# Patient Record
Sex: MALE | Race: WHITE | NOT HISPANIC OR LATINO | Employment: OTHER | URBAN - METROPOLITAN AREA
[De-identification: names, ages, dates, MRNs, and addresses within clinical notes are randomized per-mention and may not be internally consistent; named-entity substitution may affect disease eponyms.]

---

## 2017-05-08 ENCOUNTER — OFFICE VISIT (OUTPATIENT)
Dept: FAMILY MEDICINE | Facility: CLINIC | Age: 77
End: 2017-05-08
Payer: COMMERCIAL

## 2017-05-08 VITALS
SYSTOLIC BLOOD PRESSURE: 122 MMHG | WEIGHT: 210.6 LBS | HEART RATE: 84 BPM | TEMPERATURE: 98.1 F | BODY MASS INDEX: 30.22 KG/M2 | DIASTOLIC BLOOD PRESSURE: 74 MMHG

## 2017-05-08 DIAGNOSIS — R10.12 ABDOMINAL PAIN, LEFT UPPER QUADRANT: Primary | ICD-10-CM

## 2017-05-08 LAB
ALBUMIN SERPL-MCNC: 2.8 G/DL (ref 3.4–5)
ALBUMIN UR-MCNC: NEGATIVE MG/DL
ALP SERPL-CCNC: 72 U/L (ref 40–150)
ALT SERPL W P-5'-P-CCNC: 21 U/L (ref 0–70)
ANION GAP SERPL CALCULATED.3IONS-SCNC: 8 MMOL/L (ref 3–14)
APPEARANCE UR: CLEAR
AST SERPL W P-5'-P-CCNC: 17 U/L (ref 0–45)
BASOPHILS # BLD AUTO: 0 10E9/L (ref 0–0.2)
BASOPHILS NFR BLD AUTO: 0.2 %
BILIRUB SERPL-MCNC: 0.4 MG/DL (ref 0.2–1.3)
BILIRUB UR QL STRIP: NEGATIVE
BUN SERPL-MCNC: 15 MG/DL (ref 7–30)
CALCIUM SERPL-MCNC: 8.9 MG/DL (ref 8.5–10.1)
CHLORIDE SERPL-SCNC: 103 MMOL/L (ref 94–109)
CO2 SERPL-SCNC: 27 MMOL/L (ref 20–32)
COLOR UR AUTO: YELLOW
CREAT SERPL-MCNC: 0.77 MG/DL (ref 0.66–1.25)
DIFFERENTIAL METHOD BLD: ABNORMAL
EOSINOPHIL # BLD AUTO: 0.1 10E9/L (ref 0–0.7)
EOSINOPHIL NFR BLD AUTO: 1.1 %
ERYTHROCYTE [DISTWIDTH] IN BLOOD BY AUTOMATED COUNT: 12.5 % (ref 10–15)
ERYTHROCYTE [SEDIMENTATION RATE] IN BLOOD BY WESTERGREN METHOD: 84 MM/H (ref 0–20)
GFR SERPL CREATININE-BSD FRML MDRD: ABNORMAL ML/MIN/1.7M2
GLUCOSE SERPL-MCNC: 130 MG/DL (ref 70–99)
GLUCOSE UR STRIP-MCNC: NEGATIVE MG/DL
HCT VFR BLD AUTO: 39.3 % (ref 40–53)
HGB BLD-MCNC: 12.6 G/DL (ref 13.3–17.7)
HGB UR QL STRIP: ABNORMAL
KETONES UR STRIP-MCNC: NEGATIVE MG/DL
LEUKOCYTE ESTERASE UR QL STRIP: NEGATIVE
LYMPHOCYTES # BLD AUTO: 2.4 10E9/L (ref 0.8–5.3)
LYMPHOCYTES NFR BLD AUTO: 29.1 %
MCH RBC QN AUTO: 29.6 PG (ref 26.5–33)
MCHC RBC AUTO-ENTMCNC: 32.1 G/DL (ref 31.5–36.5)
MCV RBC AUTO: 92 FL (ref 78–100)
MONOCYTES # BLD AUTO: 1 10E9/L (ref 0–1.3)
MONOCYTES NFR BLD AUTO: 11.5 %
NEUTROPHILS # BLD AUTO: 4.9 10E9/L (ref 1.6–8.3)
NEUTROPHILS NFR BLD AUTO: 58.1 %
NITRATE UR QL: NEGATIVE
PH UR STRIP: 5.5 PH (ref 5–7)
PLATELET # BLD AUTO: 234 10E9/L (ref 150–450)
POTASSIUM SERPL-SCNC: 4.1 MMOL/L (ref 3.4–5.3)
PROT SERPL-MCNC: 7.8 G/DL (ref 6.8–8.8)
RBC # BLD AUTO: 4.26 10E12/L (ref 4.4–5.9)
RBC #/AREA URNS AUTO: ABNORMAL /HPF (ref 0–2)
SODIUM SERPL-SCNC: 138 MMOL/L (ref 133–144)
SP GR UR STRIP: 1.02 (ref 1–1.03)
URN SPEC COLLECT METH UR: ABNORMAL
UROBILINOGEN UR STRIP-ACNC: 0.2 EU/DL (ref 0.2–1)
WBC # BLD AUTO: 8.4 10E9/L (ref 4–11)
WBC #/AREA URNS AUTO: ABNORMAL /HPF (ref 0–2)

## 2017-05-08 PROCEDURE — 99214 OFFICE O/P EST MOD 30 MIN: CPT | Performed by: PHYSICIAN ASSISTANT

## 2017-05-08 PROCEDURE — 85652 RBC SED RATE AUTOMATED: CPT | Performed by: PHYSICIAN ASSISTANT

## 2017-05-08 PROCEDURE — 36415 COLL VENOUS BLD VENIPUNCTURE: CPT | Performed by: PHYSICIAN ASSISTANT

## 2017-05-08 PROCEDURE — 81001 URINALYSIS AUTO W/SCOPE: CPT | Performed by: PHYSICIAN ASSISTANT

## 2017-05-08 PROCEDURE — 80053 COMPREHEN METABOLIC PANEL: CPT | Performed by: PHYSICIAN ASSISTANT

## 2017-05-08 PROCEDURE — 85025 COMPLETE CBC W/AUTO DIFF WBC: CPT | Performed by: PHYSICIAN ASSISTANT

## 2017-05-08 ASSESSMENT — PAIN SCALES - GENERAL: PAINLEVEL: WORST PAIN (10)

## 2017-05-08 ASSESSMENT — ENCOUNTER SYMPTOMS
WEIGHT LOSS: 0
PALPITATIONS: 0
INSOMNIA: 0
SEIZURES: 0
BLURRED VISION: 0
DIAPHORESIS: 0
TINGLING: 0
PHOTOPHOBIA: 0
COUGH: 0
NAUSEA: 0
DYSURIA: 0
ORTHOPNEA: 0
WHEEZING: 0
DEPRESSION: 0
NECK PAIN: 0
DIARRHEA: 0
NERVOUS/ANXIOUS: 0
FEVER: 0
LOSS OF CONSCIOUSNESS: 0
SENSORY CHANGE: 0
DOUBLE VISION: 0
ABDOMINAL PAIN: 1
BACK PAIN: 0
SHORTNESS OF BREATH: 0
HEMOPTYSIS: 0
HALLUCINATIONS: 0
VOMITING: 0
CONSTIPATION: 0
BLOOD IN STOOL: 0
DIZZINESS: 0
EYE DISCHARGE: 0
EYE REDNESS: 0
HEADACHES: 0
FOCAL WEAKNESS: 0
FREQUENCY: 0
HEARTBURN: 0
MYALGIAS: 0
EYE PAIN: 0
SPUTUM PRODUCTION: 0
SORE THROAT: 0
NEUROLOGICAL NEGATIVE: 1
WEAKNESS: 0

## 2017-05-08 ASSESSMENT — LIFESTYLE VARIABLES: SUBSTANCE_ABUSE: 0

## 2017-05-08 NOTE — MR AVS SNAPSHOT
"              After Visit Summary   2017    Cristian Bridges    MRN: [id number]           Patient Information     Date Of Birth          1940        Visit Information        Provider Department      2017 1:00 PM Joshua Denney PA-C [facility]        Today's Diagnoses     Abdominal pain, left upper quadrant    -  1       Follow-ups after your visit        Follow-up notes from your care team     Return if symptoms worsen or fail to improve.      Who to contact     If you have questions or need follow up information about today's clinic visit or your schedule please contact [facility] directly at [phone].  Normal or non-critical lab and imaging results will be communicated to you by [facility]hart, letter or phone within 4 business days after the clinic has received the results. If you do not hear from us within 7 days, please contact the clinic through [facility] ([facility]hart or phone. If you have a critical or abnormal lab result, we will notify you by phone as soon as possible.  Submit refill requests through [employer] or call your pharmacy and they will forward the refill request to us. Please allow 3 business days for your refill to be completed.          Additional Information About Your Visit        MyChart Information     [employer] lets you send messages to your doctor, view your test results, renew your prescriptions, schedule appointments and more. To sign up, go to [email or web address] . Click on \"Log in\" on the left side of the screen, which will take you to the Welcome page. Then click on \"Sign up Now\" on the right side of the page.     You will be asked to enter the access code listed below, as well as some personal information. Please follow the directions to create your username and password.     Your access code is: [email or web address]  Expires: 2017  1:46 PM     Your access code will  in 90 days. If you need help or a new code, please call your [facility] or " [phone].        Care EveryWhere ID     This is your Care EveryWhere ID. This could be used by other organizations to access your [place] medical records  [id number]        Your Vitals Were     Pulse Temperature BMI (Body Mass Index)             84 98.1  F (36.7  C) (Tympanic) 30.22 kg/m2          Blood Pressure from Last 3 Encounters:   05/08/17 122/74   07/19/16 121/77   05/23/16 137/85    Weight from Last 3 Encounters:   05/08/17 210 lb 9.6 oz (95.5 kg)   07/19/16 214 lb (97.1 kg)   05/23/16 215 lb 4.8 oz (97.7 kg)              We Performed the Following     *UA reflex to Microscopic and Culture ([place] and [facility] (except [place] and [place])     CBC with platelets and differential     Comprehensive metabolic panel (BMP + Alb, Alk Phos, ALT, AST, Total. Bili, TP)     ESR: Erythrocyte sedimentation rate        Primary Care Provider Office Phone # Fax #    Joshua Denney PA-C [phone]       [facility] [id number] [address]        Thank you!     Thank you for choosing [facility]  for your care. Our goal is always to provide you with excellent care. Hearing back from our patients is one way we can continue to improve our services. Please take a few minutes to complete the written survey that you may receive in the mail after your visit with us. Thank you!             Your Updated Medication List - Protect others around you: Learn how to safely use, store and throw away your medicines at www.disposemymeds.org.          This list is accurate as of: 5/8/17  1:46 PM.  Always use your most recent med list.                   Brand Name Dispense Instructions for use    aspirin 81 MG tablet      Take 1 tablet (81 mg) by mouth daily       COSOPT 2-0.5 % ophthalmic solution   Generic drug:  dorzolamide-timolol      1 drop 2 times daily       LUMIGAN OP      Place 1 drop into both eyes every evening       Multi-vitamin Tabs tablet      Take 1  tablet by mouth daily

## 2017-05-08 NOTE — NURSING NOTE
"Chief Complaint   Patient presents with     Flank Pain       Initial /74 (BP Location: Right arm, Patient Position: Chair, Cuff Size: Adult Large)  Pulse 84  Temp 98.1  F (36.7  C) (Tympanic)  Wt 210 lb 9.6 oz (95.5 kg)  BMI 30.22 kg/m2 Estimated body mass index is 30.22 kg/(m^2) as calculated from the following:    Height as of 5/23/16: 5' 10\" (1.778 m).    Weight as of this encounter: 210 lb 9.6 oz (95.5 kg).  Medication Reconciliation: complete    Health Maintenance that is potentially due pending provider review:  NONE    n/a    Chery DE GUZMAN CMA    "

## 2017-05-08 NOTE — PROGRESS NOTES
HPI    SUBJECTIVE:                                                    Cristian Bridges is a 77 year old male who presents to clinic today for left upper abdominal pain that was present on Friday and lasted for a day or so. It was severe pain and cause disruption in his sleep. He also developed body aches that night as well as night sweats. He has not had recurrence of this.      Flank pain       Duration: Since Friday     Description (location/character/radiation): Patient states that the pain is on the left side right below his ribs. Patient states that he has only had the pain one time     Intensity:   severe    Accompanying signs and symptoms: none    History (similar episodes/previous evaluation): None    Precipitating or alleviating factors: None    Therapies tried and outcome: None   Problem list and histories reviewed & adjusted, as indicated.  Additional history: as documented    Patient Active Problem List   Diagnosis     Health Care Home     Senile nuclear sclerosis     Atrial fibrillation, unspecified     Long-term (current) use of anticoagulants [Z79.01]     Atrial fibrillation (HCC) [I48.91]     Subdural hematoma (H)     Past Surgical History:   Procedure Laterality Date     APPENDECTOMY  1947     CRANIOTOMY, EVACUATE HEMATOMA SUBDURAL, COMBINED Right 2/4/2016    Procedure: COMBINED CRANIOTOMY, EVACUATE HEMATOMA SUBDURAL;  Surgeon: Giovanni Caputo MD;  Location:  OR     [address]     EYE SURGERY       ORTHOPEDIC SURGERY       PHACOEMULSIFICATION WITH STANDARD INTRAOCULAR LENS IMPLANT Left 9/21/2015    Procedure: PHACOEMULSIFICATION WITH STANDARD INTRAOCULAR LENS IMPLANT;  Surgeon: Cristobal Banks MD;  Location: [facility] OR     PHACOEMULSIFICATION WITH STANDARD INTRAOCULAR LENS IMPLANT Right 10/5/2015    Procedure: PHACOEMULSIFICATION WITH STANDARD INTRAOCULAR LENS IMPLANT;  Surgeon: Cristobal Banks MD;  Location: [facility] OR     RELEASE CARPAL TUNNEL Left 1/8/2016    Procedure: RELEASE  CARPAL TUNNEL;  Surgeon: Joshua Barba MD;  Location: [facility] OR       Social History   Substance Use Topics     Smoking status: Former Smoker     Quit date: 5/30/1975     Smokeless tobacco: Never Used     Alcohol use Not on file     Family History   Problem Relation Age of Onset     CEREBROVASCULAR DISEASE Father      Lung Cancer Brother          Current Outpatient Prescriptions   Medication Sig Dispense Refill     aspirin 81 MG tablet Take 1 tablet (81 mg) by mouth daily  0     multivitamin, therapeutic with minerals (MULTI-VITAMIN) TABS Take 1 tablet by mouth daily       dorzolamide-timolol (COSOPT) 2-0.5 % ophthalmic solution 1 drop 2 times daily       Bimatoprost (LUMIGAN OP) Place 1 drop into both eyes every evening        Allergies   Allergen Reactions     Nka [No Known Allergies]      Labs reviewed in EPIC    Reviewed and updated as needed this visit by clinical staff  Tobacco  Allergies  Med Hx  Surg Hx  Fam Hx  Soc Hx      Reviewed and updated as needed this visit by Provider       Review of Systems   Constitutional: Negative for diaphoresis, fever, malaise/fatigue and weight loss.   HENT: Negative for congestion, ear discharge, ear pain, hearing loss, nosebleeds and sore throat.    Eyes: Negative for blurred vision, double vision, photophobia, pain, discharge and redness.   Respiratory: Negative for cough, hemoptysis, sputum production, shortness of breath and wheezing.    Cardiovascular: Negative for chest pain, palpitations, orthopnea and leg swelling.   Gastrointestinal: Positive for abdominal pain. Negative for blood in stool, constipation, diarrhea, heartburn, melena, nausea and vomiting.   Genitourinary: Negative.  Negative for dysuria, frequency and urgency.   Musculoskeletal: Negative for back pain, joint pain, myalgias and neck pain.   Skin: Negative for itching and rash.   Neurological: Negative.  Negative for dizziness, tingling, sensory change, focal weakness, seizures, loss of  consciousness, weakness and headaches.   Endo/Heme/Allergies: Negative.    Psychiatric/Behavioral: Negative for depression, hallucinations, substance abuse and suicidal ideas. The patient is not nervous/anxious and does not have insomnia.          Physical Exam   Constitutional: He is oriented to person, place, and time and well-developed, well-nourished, and in no distress.   HENT:   Head: Normocephalic and atraumatic.   Right Ear: External ear normal.   Left Ear: External ear normal.   Nose: Nose normal.   Mouth/Throat: Oropharynx is clear and moist.   Eyes: Conjunctivae and EOM are normal. Pupils are equal, round, and reactive to light. Right eye exhibits no discharge. Left eye exhibits no discharge. No scleral icterus.   Neck: Normal range of motion. Neck supple. No thyromegaly present.   Cardiovascular: Normal rate, regular rhythm, normal heart sounds and intact distal pulses.  Exam reveals no gallop and no friction rub.    No murmur heard.  Pulmonary/Chest: Effort normal and breath sounds normal. No respiratory distress. He has no wheezes. He has no rales. He exhibits no tenderness.   Abdominal: Soft. Bowel sounds are normal. He exhibits no distension and no mass. There is no tenderness. There is no rebound and no guarding.   Musculoskeletal: Normal range of motion. He exhibits no edema or tenderness.   Lymphadenopathy:     He has no cervical adenopathy.   Neurological: He is alert and oriented to person, place, and time. He has normal reflexes. No cranial nerve deficit. He exhibits normal muscle tone. Gait normal. Coordination normal.   Skin: Skin is warm and dry. No rash noted. No erythema.   Psychiatric: Mood, memory, affect and judgment normal.       (R10.12) Abdominal pain, left upper quadrant  (primary encounter diagnosis)  Comment:   Plan: CBC with platelets and differential,         Comprehensive metabolic panel (BMP + Alb, Alk         Phos, ALT, AST, Total. Bili, TP), *UA reflex to        Microscopic  and Culture ([place] and [place]         Clinics (except [place] and [place]), ESR:         Erythrocyte sedimentation rate         At this time his pain has resolved but we will do some screening lab tests including CBC, comprehensive metabolic panel, ESR and contact him with these results.

## 2017-09-07 ENCOUNTER — HOSPITAL ENCOUNTER (OUTPATIENT)
Dept: CARDIOLOGY | Facility: CLINIC | Age: 77
Discharge: HOME | End: 2017-09-07
Attending: PHYSICIAN ASSISTANT | Admitting: PHYSICIAN ASSISTANT
Payer: COMMERCIAL

## 2017-09-07 DIAGNOSIS — I48.19 PERSISTENT ATRIAL FIBRILLATION (H): ICD-10-CM

## 2017-09-07 DIAGNOSIS — I77.810 ASCENDING AORTA DILATION (H): ICD-10-CM

## 2017-09-07 PROCEDURE — 25500064 ZZH RX 255 OP 636: Performed by: PHYSICIAN ASSISTANT

## 2017-09-07 PROCEDURE — 93306 TTE W/DOPPLER COMPLETE: CPT | Mod: 26 | Performed by: INTERNAL MEDICINE

## 2017-09-07 PROCEDURE — 40000264 ECHO COMPLETE WITH OPTISON

## 2017-09-07 RX ADMIN — HUMAN ALBUMIN MICROSPHERES AND PERFLUTREN 2 ML: 10; .22 INJECTION, SOLUTION INTRAVENOUS at 15:15

## 2017-09-12 ENCOUNTER — OFFICE VISIT (OUTPATIENT)
Dept: CARDIOLOGY | Facility: CLINIC | Age: 77
End: 2017-09-12
Attending: PHYSICIAN ASSISTANT
Payer: COMMERCIAL

## 2017-09-12 VITALS
OXYGEN SATURATION: 96 % | WEIGHT: 216 LBS | SYSTOLIC BLOOD PRESSURE: 128 MMHG | BODY MASS INDEX: 30.99 KG/M2 | DIASTOLIC BLOOD PRESSURE: 80 MMHG | HEART RATE: 81 BPM

## 2017-09-12 DIAGNOSIS — I48.20 CHRONIC ATRIAL FIBRILLATION (H): ICD-10-CM

## 2017-09-12 PROCEDURE — 99213 OFFICE O/P EST LOW 20 MIN: CPT | Performed by: INTERNAL MEDICINE

## 2017-09-12 NOTE — LETTER
9/12/2017    Joshua Denney PA-C  [address] [place] [address]    RE: Cristian Bridges       Dear Colleague,    I had the pleasure of seeing Cristian Bridges in the [facility].    REASON FOR VISIT:  Evaluation of permanent atrial fibrillation.      Mr. Bridges is a delightful 77-year-old gentleman with a history of glaucoma, subdural hematoma and permanent atrial fibrillation who is here for evaluation.      The patient was initially found to have AFib as an incidental finding.  He was completely asymptomatic and was started on blood thinners.  AV jessica agents were not started due to baseline low heart rate.  Unfortunately, on 02/05/2016 he presented to the ED with headache and was found to have a spontaneous right subdural hematoma.  He underwent craniectomy/hematoma evacuation.  Since then, anticoagulation was held.      I last saw him in 04/2016.  At that time we repeated a Holter and confirmed the heart rates were well controlled.  We discussed about possible Watchman procedure; however, he was not interested.      At the moment, he is doing well.  He continues to be asymptomatic and denies any symptoms such as chest pain, shortness of breath, lightheadedness, near-syncope or syncopal episode.     Outpatient Encounter Prescriptions as of 9/12/2017   Medication Sig Dispense Refill     aspirin 81 MG tablet Take 1 tablet (81 mg) by mouth daily  0     multivitamin, therapeutic with minerals (MULTI-VITAMIN) TABS Take 1 tablet by mouth daily       dorzolamide-timolol (COSOPT) 2-0.5 % ophthalmic solution 1 drop 2 times daily       Bimatoprost (LUMIGAN OP) Place 1 drop into both eyes every evening        No facility-administered encounter medications on file as of 9/12/2017.       ASSESSMENT AND PLAN:   1.  Permanent atrial fibrillation.  He continues to be asymptomatic.  A monitor done last year showed the heart rates are well controlled.  Echocardiogram repeated on 09/07 of this  year revealed normal cardiac function.  We will continue current conservative therapy for now.   2.  Embolic prevention.  CHADS-VASc score of 2.  We again discussed about the role of anticoagulation and possible Watchman procedure; however, he is not interested in pursuing it or attempt oral anticoagulation again.  We will continue on baby aspirin.   3.  Aortic root dilation.  He had an echo done this year which seems to be stable.  We will repeat an echo in one year.   4.  Followup care.  Follow up in clinic in a year or earlier as needed.     Again, thank you for allowing me to participate in the care of your patient.      Sincerely,    David Diop MD     [facility]

## 2017-09-12 NOTE — MR AVS SNAPSHOT
"              After Visit Summary   2017    Cristian Bridges    MRN: [id number]           Patient Information     Date Of Birth          1940        Visit Information        Provider Department      2017 1:30 PM David Diop MD [facility]        Today's Diagnoses     Chronic atrial fibrillation (H)           Follow-ups after your visit        Who to contact     If you have questions or need follow up information about today's clinic visit or your schedule please contact [facility] directly at [phone].  Normal or non-critical lab and imaging results will be communicated to you by [employer], letter or phone within 4 business days after the clinic has received the results. If you do not hear from us within 7 days, please contact the clinic through [employer] or phone. If you have a critical or abnormal lab result, we will notify you by phone as soon as possible.  Submit refill requests through [employer] or call your pharmacy and they will forward the refill request to us. Please allow 3 business days for your refill to be completed.          Additional Information About Your Visit        MyChart Information     [employer] lets you send messages to your doctor, view your test results, renew your prescriptions, schedule appointments and more. To sign up, go to [email or web address] . Click on \"Log in\" on the left side of the screen, which will take you to the Welcome page. Then click on \"Sign up Now\" on the right side of the page.     You will be asked to enter the access code listed below, as well as some personal information. Please follow the directions to create your username and password.     Your access code is: [email or web address]  Expires: 2017  2:29 PM     Your access code will  in 90 days. If you need help or a new code, please call your [place] clinic or [phone].        Care EveryWhere " ID     This is your Care EveryWhere ID. This could be used by other organizations to access your [place] medical records  [id number]        Your Vitals Were     Pulse Pulse Oximetry BMI (Body Mass Index)             81 96% 30.99 kg/m2          Blood Pressure from Last 3 Encounters:   09/12/17 128/80   05/08/17 122/74   07/19/16 121/77    Weight from Last 3 Encounters:   09/12/17 98 kg (216 lb)   05/08/17 95.5 kg (210 lb 9.6 oz)   07/19/16 97.1 kg (214 lb)              We Performed the Following     Follow-Up with Electrophysiologist        Primary Care Provider Office Phone # Fax #    Joshua Denney PA-C [phone] [address] [place] [address]        Equal Access to Services     MON[place] LAURENT : Hadii aad ku hadasho Soshelley, waaxda luqadaha, qaybta kaalmada ingeyaviola, rui shook . So [facility] [place] [phone].    ATENCIÓN: Si habla español, tiene a watts disposición servicios gratuitos de asistencia lingüística. Khushbu al [phone].    We comply with applicable federal civil rights laws and [place] laws. We do not discriminate on the basis of race, color, national origin, age, disability sex, sexual orientation or gender identity.            Thank you!     Thank you for choosing [facility] PHYS[facility] AT [facility]  for your care. Our goal is always to provide you with excellent care. Hearing back from our patients is one way we can continue to improve our services. Please take a few minutes to complete the written survey that you may receive in the mail after your visit with us. Thank you!             Your Updated Medication List - Protect others around you: Learn how to safely use, store and throw away your medicines at www.disposemymeds.org.          This list is accurate as of: 9/12/17  2:29 PM.  Always use your most recent med list.                   Brand Name Dispense Instructions for use Diagnosis    aspirin 81 MG tablet      Take 1  tablet (81 mg) by mouth daily    Chronic atrial fibrillation (H)       COSOPT 2-0.5 % ophthalmic solution   Generic drug:  dorzolamide-timolol      1 drop 2 times daily        LUMIGAN OP      Place 1 drop into both eyes every evening        Multi-vitamin Tabs tablet      Take 1 tablet by mouth daily

## 2017-09-12 NOTE — PROGRESS NOTES
Electrophysiology/ Clinic Note         H&P and Plan:     [id number]      David Diop MD    Physical Exam:  Vitals: /80  Pulse 81  Wt 98 kg (216 lb)  SpO2 96%  BMI 30.99 kg/m2    Constitutional:  AAO x3.  Pt is in NAD.  HEAD: normocephalic.  SKIN: Skin normal color, texture and turgor with no lesions or eruptions.  Eyes: PERRL, EOMI.  ENT:  Supple, normal JVP. No lymphadenopathy or thyroid enlargement.  Chest:  CTAB.  Cardiac:  IIR, variable sound of S1 and Normal S2.   No murmurs rubs or gallop.    Abdomen:  Normal BS.  Soft, non-tender and non-distended.  No rebound or guarding.    Extremities:  Pedious pulses palpable B/L.  No LE edema noticed.   Neurological: Strength and sensation grossly symmetric and intact throughout.       CURRENT MEDICATIONS:  Current Outpatient Prescriptions   Medication Sig Dispense Refill     aspirin 81 MG tablet Take 1 tablet (81 mg) by mouth daily  0     multivitamin, therapeutic with minerals (MULTI-VITAMIN) TABS Take 1 tablet by mouth daily       dorzolamide-timolol (COSOPT) 2-0.5 % ophthalmic solution 1 drop 2 times daily       Bimatoprost (LUMIGAN OP) Place 1 drop into both eyes every evening          ALLERGIES     Allergies   Allergen Reactions     Nka [No Known Allergies]        PAST MEDICAL HISTORY:  Past Medical History:   Diagnosis Date     Irregular heart beat        PAST SURGICAL HISTORY:  Past Surgical History:   Procedure Laterality Date     APPENDECTOMY  1947     CRANIOTOMY, EVACUATE HEMATOMA SUBDURAL, COMBINED Right 2/4/2016    Procedure: COMBINED CRANIOTOMY, EVACUATE HEMATOMA SUBDURAL;  Surgeon: Giovanni Caputo MD;  Location:  OR     [address]     EYE SURGERY       ORTHOPEDIC SURGERY       PHACOEMULSIFICATION WITH STANDARD INTRAOCULAR LENS IMPLANT Left 9/21/2015    Procedure: PHACOEMULSIFICATION WITH STANDARD INTRAOCULAR LENS IMPLANT;  Surgeon: Cristobal Banks MD;  Location: [facility] OR     PHACOEMULSIFICATION WITH STANDARD INTRAOCULAR LENS  IMPLANT Right 10/5/2015    Procedure: PHACOEMULSIFICATION WITH STANDARD INTRAOCULAR LENS IMPLANT;  Surgeon: Cristobal Banks MD;  Location: [facility] OR     RELEASE CARPAL TUNNEL Left 1/8/2016    Procedure: RELEASE CARPAL TUNNEL;  Surgeon: Joshua Barba MD;  Location: [facility] OR       FAMILY HISTORY:  Family History   Problem Relation Age of Onset     CEREBROVASCULAR DISEASE Father      Lung Cancer Brother        SOCIAL HISTORY:  Social History     Social History     Marital status:      Spouse name: N/A     Number of children: N/A     Years of education: N/A     Social History Main Topics     Smoking status: Former Smoker     Quit date: 5/30/1975     Smokeless tobacco: Never Used     Alcohol use None     Drug use: None     Sexual activity: Not Asked     Other Topics Concern      Service No     Blood Transfusions No     Caffeine Concern No     Occupational Exposure No     Hobby Hazards No     Sleep Concern No     Stress Concern No     Weight Concern No     Special Diet No     Back Care No     Exercise No     Self-Exams Yes     Social History Narrative       Review of Systems:  Skin:  Negative     Eyes:  Positive for glasses  ENT:  Negative    Respiratory:  Negative for shortness of breath;dyspnea on exertion;cough  Cardiovascular:  Negative for;palpitations;chest pain;edema;syncope or near-syncope;exercise intolerance;fatigue;lightheadedness;dizziness    Gastroenterology: Negative for heartburn;melena;hematochezia  Genitourinary:  Negative    Musculoskeletal:  Positive for joint pain;joint swelling;joint stiffness  Neurologic:  Positive for numbness or tingling of hands  Psychiatric:  Negative    Heme/Lymph/Imm:  Negative    Endocrine:  Negative        Recent Lab Results:  LIPID RESULTS:  No results found for: CHOL, HDL, LDL, TRIG, CHOLHDLRATIO    LIVER ENZYME RESULTS:  Lab Results   Component Value Date    AST 17 05/08/2017    ALT 21 05/08/2017       CBC RESULTS:  Lab Results   Component Value Date     WBC 8.4 2017    RBC 4.26 (L) 2017    HGB 12.6 (L) 2017    HCT 39.3 (L) 2017    MCV 92 2017    MCH 29.6 2017    MCHC 32.1 2017    RDW 12.5 2017     2017       BMP RESULTS:  Lab Results   Component Value Date     2017    POTASSIUM 4.1 2017    CHLORIDE 103 2017    CO2 27 2017    ANIONGAP 8 2017     (H) 2017    BUN 15 2017    CR 0.77 2017    GFRESTIMATED >90  Non  GFR Calc   2017    GFRESTBLACK >90   GFR Calc   2017    BARRY 8.9 2017        A1C RESULTS:  No results found for: A1C    INR RESULTS:  Lab Results   Component Value Date    INR 1.31 (H) 2016    INR 1.37 (H) 2016         ECHOCARDIOGRAM  Recent Results (from the past 8760 hour(s))   ECHO COMPLETE WITH OPTISON    Narrative    [id number]  [id number]  [id number]  [id number]^LASHAY^BELKIS^M           [facility]  Echocardiography Laboratory  [facility] [place] [address]        Name: KASSI SANDOVAL  MRN: [id number]  : 1940  Study Date: 2017 02:46 PM  Age: 77 yrs  Gender: Male  Patient Location: [facility]  Reason For Study: Thoracic aortic ectasia, Persistent atrial fibrillation  Ordering Physician: BELKIS METZ  Referring Physician: Joshua Denney  Performed By: Anuradha Gregorio RDCS     BSA: 2.1 m2  Height: 70 in  Weight: 210 lb  HR: 92  BP: 123/68 mmHg  _____________________________________________________________________________  __        Procedure  Complete Echo Adult. Contrast Optison.  _____________________________________________________________________________  __        Interpretation Summary     The left ventricle is normal in size.  There is mild concentric left ventricular hypertrophy.  The visual ejection fraction is estimated at 60-65%.  E by E prime ratio is less than 8, that likely suggests normal left  ventricular filling  pressures.  The right ventricle is normal in structure, function and size.  The left atrium is moderately dilated.  The right atrium is moderately dilated.  Valves are normal in function.  Borderline aortic root dilatation, probably normal when corrected for age and  size.  The ascending aorta is Mildly dilated measuring 4.4 cm.  Compared to the last echo done 2 years ago, the ascending aorta has enlarged.  _____________________________________________________________________________  __        Left Ventricle  The left ventricle is normal in size. There is mild concentric left  ventricular hypertrophy. The visual ejection fraction is estimated at 60-65%.  Left ventricular diastolic function is indeterminate. E by E prime ratio is  less than 8, that likely suggests normal left ventricular filling pressures.  No regional wall motion abnormalities noted.     Right Ventricle  The right ventricle is normal in structure, function and size.     Atria  The left atrium is moderately dilated. The right atrium is moderately dilated.  There is no atrial shunt seen.     Mitral Valve  The mitral valve leaflets appear normal. There is no evidence of stenosis,  fluttering, or prolapse. There is trace mitral regurgitation.        Tricuspid Valve  Normal tricuspid valve. There is trace tricuspid regurgitation. Right  ventricle systolic pressure estimate normal.     Aortic Valve  There is mild trileaflet aortic sclerosis. No aortic regurgitation is present.  No aortic stenosis is present.     Pulmonic Valve  Normal pulmonic valve.     Vessels  Borderline aortic root dilatation. The ascending aorta is Mildly dilated. The  IVC is normal in size and reactivity with respiration, suggesting normal  central venous pressure.     Pericardium  There is no pericardial effusion.        Rhythm  The rhythm was atrial fibrillation with controlled ventricular rate at  rest.  _____________________________________________________________________________  __  MMode/2D Measurements & Calculations  IVSd: 1.3 cm     LVIDd: 4.1 cm  LVIDs: 2.5 cm  LVPWd: 1.2 cm  FS: 39.2 %  EDV(Teich): 75.5 ml  ESV(Teich): 22.6 ml  LV mass(C)d: 189.7 grams  LV mass(C)dI: 89.0 grams/m2  Ao root diam: 4.0 cm  asc Aorta Diam: 4.4 cm  LA volume (AL/bp): 121.4 cm3     LA Volume Indexed (AL/bp): 57.0 ml/m2        Doppler Measurements & Calculations  MV E max simon: 74.0 cm/sec  MV dec time: 0.19 sec  TR max simon: 184.3 cm/sec  TR max P.6 mmHg  Lateral E/e': 4.5  Medial E/e': 8.4              _____________________________________________________________________________  __        Report approved by: Christina El 2017 04:14 PM            No orders of the defined types were placed in this encounter.    No orders of the defined types were placed in this encounter.    There are no discontinued medications.      Encounter Diagnosis   Name Primary?     Chronic atrial fibrillation (H)          AP Gordillo PA-C  [address]  [place] [id number]

## 2017-09-13 NOTE — PROGRESS NOTES
REASON FOR VISIT:  Evaluation of permanent atrial fibrillation.      HISTORY OF PRESENT ILLNESS:  Mr. Sandoval is a delightful 77-year-old gentleman with a history of glaucoma, subdural hematoma and permanent atrial fibrillation who is here for evaluation.      The patient was initially found to have AFib as an incidental finding.  He was completely asymptomatic and was started on blood thinners.  AV jessica agents were not started due to baseline low heart rate.  Unfortunately, on 02/05/2016 he presented to the ED with headache and was found to have a spontaneous right subdural hematoma.  He underwent craniectomy/hematoma evacuation.  Since then, anticoagulation was held.      I last saw him in 04/2016.  At that time we repeated a Holter and confirmed the heart rates were well controlled.  We discussed about possible Watchman procedure; however, he was not interested.      At the moment, he is doing well.  He continues to be asymptomatic and denies any symptoms such as chest pain, shortness of breath, lightheadedness, near-syncope or syncopal episode.      ASSESSMENT AND PLAN:   1.  Permanent atrial fibrillation.  He continues to be asymptomatic.  A monitor done last year showed the heart rates are well controlled.  Echocardiogram repeated on 09/07 of this year revealed normal cardiac function.  We will continue current conservative therapy for now.   2.  Embolic prevention.  CHADS-VASc score of 2.  We again discussed about the role of anticoagulation and possible Watchman procedure; however, he is not interested in pursuing it or attempt oral anticoagulation again.  We will continue on baby aspirin.   3.  Aortic root dilation.  He had an echo done this year which seems to be stable.  We will repeat an echo in one year.   4.  Followup care.  Follow up in clinic in a year or earlier as needed.         ALFONSO TERRAZAS MD             D: 09/12/2017 14:28   T: 09/12/2017 19:31   MT: ANJU      Name:     KASSI SANDOVAL    MRN:      [id number]        Account:      [id number]   :      1940           Service Date: 2017      Document: [id number]

## 2022-08-16 ENCOUNTER — OFFICE VISIT (OUTPATIENT)
Dept: FAMILY MEDICINE | Facility: CLINIC | Age: 82
End: 2022-08-16
Payer: COMMERCIAL

## 2022-08-16 VITALS
TEMPERATURE: 98.2 F | DIASTOLIC BLOOD PRESSURE: 74 MMHG | HEART RATE: 82 BPM | RESPIRATION RATE: 18 BRPM | WEIGHT: 221 LBS | BODY MASS INDEX: 31.64 KG/M2 | SYSTOLIC BLOOD PRESSURE: 140 MMHG | HEIGHT: 70 IN | OXYGEN SATURATION: 98 %

## 2022-08-16 DIAGNOSIS — I48.91 ATRIAL FIBRILLATION, UNSPECIFIED TYPE (H): ICD-10-CM

## 2022-08-16 DIAGNOSIS — R26.81 UNSTEADINESS: Primary | ICD-10-CM

## 2022-08-16 DIAGNOSIS — R06.02 SOB (SHORTNESS OF BREATH): ICD-10-CM

## 2022-08-16 DIAGNOSIS — Z86.79 HISTORY OF SUBDURAL HEMATOMA: ICD-10-CM

## 2022-08-16 DIAGNOSIS — H91.91 HEARING LOSS OF RIGHT EAR, UNSPECIFIED HEARING LOSS TYPE: ICD-10-CM

## 2022-08-16 LAB
ANION GAP SERPL CALCULATED.3IONS-SCNC: 5 MMOL/L (ref 3–14)
BUN SERPL-MCNC: 14 MG/DL (ref 7–30)
CALCIUM SERPL-MCNC: 9.1 MG/DL (ref 8.5–10.1)
CHLORIDE BLD-SCNC: 108 MMOL/L (ref 94–109)
CO2 SERPL-SCNC: 28 MMOL/L (ref 20–32)
CREAT SERPL-MCNC: 0.68 MG/DL (ref 0.66–1.25)
ERYTHROCYTE [DISTWIDTH] IN BLOOD BY AUTOMATED COUNT: 12.8 % (ref 10–15)
GFR SERPL CREATININE-BSD FRML MDRD: >90 ML/MIN/1.73M2
GLUCOSE BLD-MCNC: 124 MG/DL (ref 70–99)
HCT VFR BLD AUTO: 41.7 % (ref 40–53)
HGB BLD-MCNC: 14 G/DL (ref 13.3–17.7)
MCH RBC QN AUTO: 31.3 PG (ref 26.5–33)
MCHC RBC AUTO-ENTMCNC: 33.6 G/DL (ref 31.5–36.5)
MCV RBC AUTO: 93 FL (ref 78–100)
NT-PROBNP SERPL-MCNC: 791 PG/ML (ref 0–1800)
PLATELET # BLD AUTO: 138 10E3/UL (ref 150–450)
POTASSIUM BLD-SCNC: 4.4 MMOL/L (ref 3.4–5.3)
RBC # BLD AUTO: 4.48 10E6/UL (ref 4.4–5.9)
SODIUM SERPL-SCNC: 141 MMOL/L (ref 133–144)
TROPONIN I SERPL HS-MCNC: 9 NG/L
TSH SERPL DL<=0.005 MIU/L-ACNC: 0.98 MU/L (ref 0.4–4)
WBC # BLD AUTO: 7.7 10E3/UL (ref 4–11)

## 2022-08-16 PROCEDURE — 84484 ASSAY OF TROPONIN QUANT: CPT | Performed by: FAMILY MEDICINE

## 2022-08-16 PROCEDURE — 99204 OFFICE O/P NEW MOD 45 MIN: CPT | Performed by: FAMILY MEDICINE

## 2022-08-16 PROCEDURE — 85027 COMPLETE CBC AUTOMATED: CPT | Performed by: FAMILY MEDICINE

## 2022-08-16 PROCEDURE — 84443 ASSAY THYROID STIM HORMONE: CPT | Performed by: FAMILY MEDICINE

## 2022-08-16 PROCEDURE — 83880 ASSAY OF NATRIURETIC PEPTIDE: CPT | Performed by: FAMILY MEDICINE

## 2022-08-16 PROCEDURE — 36415 COLL VENOUS BLD VENIPUNCTURE: CPT | Performed by: FAMILY MEDICINE

## 2022-08-16 PROCEDURE — 80048 BASIC METABOLIC PNL TOTAL CA: CPT | Performed by: FAMILY MEDICINE

## 2022-08-16 ASSESSMENT — PAIN SCALES - GENERAL: PAINLEVEL: NO PAIN (0)

## 2022-08-16 NOTE — LETTER
August 17, 2022      Cristian Bridges  [address]  [facility] [address]        Dear ,    We are writing to inform you of your test results.    Lab results showed mildly low platelet counts, will continue to monitor periodically. Other lab results came back unremarkable. Follow-up in 6 to 8 weeks weeks or earlier if needed.     Resulted Orders   TSH with free T4 reflex   Result Value Ref Range    TSH 0.98 0.40 - 4.00 mU/L   BNP-N terminal pro   Result Value Ref Range    N Terminal Pro BNP Outpatient 791 0 - 1,800 pg/mL      Comment:      Reference range shown and results flagged as abnormal are for the outpatient, non acute settings. Establishing a baseline value for each individual patient is useful for follow-up.    Suggested inpatient cut points for confirming diagnosis of CHF in an acute setting are:  >450 pg/mL (age 18 to less than 50)  >900 pg/mL (age 50 to less than 75)  >1800 pg/mL (75 yrs and older)    An inpatient or emergency department NT-proPBNP <300 pg/mL effectively rules out acute CHF, with 99% negative predictive value.       Troponin I   Result Value Ref Range    Troponin I High Sensitivity 9 <79 ng/L      Comment:      This Troponin-I result was obtained using a Siemens Dimension Vista High Sensitivity Troponin-I assay (TNIH). Effective 11/23/21, nine labs/sites in the [facility] switched from a Siemens [place] Contemporary Troponin I assay (CTNI) to a Siemens [place] High-Sensitivity Troponin I assay (TNIH).   Basic metabolic panel  (Ca, Cl, CO2, Creat, Gluc, K, Na, BUN)   Result Value Ref Range    Sodium 141 133 - 144 mmol/L    Potassium 4.4 3.4 - 5.3 mmol/L    Chloride 108 94 - 109 mmol/L    Carbon Dioxide (CO2) 28 20 - 32 mmol/L    Anion Gap 5 3 - 14 mmol/L    Urea Nitrogen 14 7 - 30 mg/dL    Creatinine 0.68 0.66 - 1.25 mg/dL    Calcium 9.1 8.5 - 10.1 mg/dL    Glucose 124 (H) 70 - 99 mg/dL    GFR Estimate >90 >60 mL/min/1.73m2      Comment:      Effective December 21, 2021  eGFRcr in adults is calculated using the 2021 CKD-EPI creatinine equation which includes age and gender (Sterling et al., NE, DOI: 10.1056/PZKTka3736625)   CBC with platelets   Result Value Ref Range    WBC Count 7.7 4.0 - 11.0 10e3/uL    RBC Count 4.48 4.40 - 5.90 10e6/uL    Hemoglobin 14.0 13.3 - 17.7 g/dL    Hematocrit 41.7 40.0 - 53.0 %    MCV 93 78 - 100 fL    MCH 31.3 26.5 - 33.0 pg    MCHC 33.6 31.5 - 36.5 g/dL    RDW 12.8 10.0 - 15.0 %    Platelet Count 138 (L) 150 - 450 10e3/uL       If you have any questions or concerns, please call the clinic at the number listed above.       Sincerely,      Dev Bucio MD

## 2022-08-16 NOTE — NURSING NOTE
"Chief Complaint   Patient presents with     instability     BP (!) 140/74 (Cuff Size: Adult Regular)   Pulse 82   Temp 98.2  F (36.8  C) (Tympanic)   Resp 18   Ht 1.778 m (5' 10\")   Wt 100.2 kg (221 lb)   SpO2 98%   BMI 31.71 kg/m   Estimated body mass index is 31.71 kg/m  as calculated from the following:    Height as of this encounter: 1.778 m (5' 10\").    Weight as of this encounter: 100.2 kg (221 lb).  Patient presents to the clinic using No DME      Health Maintenance that is potentially due pending provider review:    Health Maintenance Due   Topic Date Due     ANNUAL REVIEW OF HM ORDERS  Never done     ADVANCE CARE PLANNING  Never done     DTAP/TDAP/TD IMMUNIZATION (1 - Tdap) Never done     ZOSTER IMMUNIZATION (1 of 2) Never done     MEDICARE ANNUAL WELLNESS VISIT  Never done     Pneumococcal Vaccine: 65+ Years (2 - PCV) 11/14/2015     COVID-19 Vaccine (4 - Booster for Moderna series) 03/04/2022                "

## 2022-08-16 NOTE — PROGRESS NOTES
Assessment & Plan     Unsteadiness  Differentials discussed in detail including neurological etiology.  History of subdural bleed in 2016.  CT head ordered for further evaluation.  - CBC with platelets; Future  - Basic metabolic panel  (Ca, Cl, CO2, Creat, Gluc, K, Na, BUN); Future  - CT Head w/o Contrast; Future  - Troponin I; Future  - Adult Neurology  Referral; Future  - TSH with free T4 reflex; Future    Atrial fibrillation, unspecified type (H)  Known to have atrial fibrillation and ascending aortic dilatation, experiencing exertional dyspnea which started in the recent past.  CBC, BMP, troponin I, BNP, TSH, echocardiogram and Lexiscan ordered for further evaluation.  Cardiology referral placed  - Adult Cardiology Eval  Referral; Future  - TSH with free T4 reflex; Future    SOB (shortness of breath)  As above  - NM Lexiscan stress test; Future  - Echocardiogram Complete; Future  - BNP-N terminal pro; Future    History of subdural hematoma  Recommended to follow-up with neurology as well  - Adult Neurology  Referral; Future    Hearing loss of right ear, unspecified hearing loss type  Physical examination remarkable for right ear serous otitis media.  History of hearing difficulty from right ear for many decades.  ENT referral placed for further review and recommendations.  - Adult ENT  Referral; Future    Dev Bucio MD  [facility]    Yesika Foster is a 82 year old, presenting for the following health issues:  instability      HPI     Concern - Instability   Onset: about a month   Description: Feeling wobbly on his feet. Getting headaches again   Intensity: moderate  Progression of Symptoms:  same  Accompanying Signs & Symptoms: no falls, no vision changes. Having some shortness of breath. Stairs make it worse.   Previous history of similar problem: subdural hematoma 6 years ago- had surgery.   Therapies tried and outcome:  Very careful  "when walking around         Review of Systems   Constitutional, HEENT, cardiovascular, pulmonary, GI, , musculoskeletal, neuro, skin, endocrine and psych systems are negative, except as otherwise noted.      Objective    BP (!) 140/74 (Cuff Size: Adult Regular)   Pulse 82   Temp 98.2  F (36.8  C) (Tympanic)   Resp 18   Ht 1.778 m (5' 10\")   Wt 100.2 kg (221 lb)   SpO2 98%   BMI 31.71 kg/m    Body mass index is 31.71 kg/m .  Physical Exam   GENERAL: alert, no distress, frail and elderly  EYES: Eyes grossly normal to inspection, PERRL and conjunctivae and sclerae normal  HENT: normal cephalic/atraumatic, right ear: clear effusion, left ear: normal: no effusions, no erythema, normal landmarks, nose and mouth without ulcers or lesions, oropharynx clear and oral mucous membranes moist  NECK: no adenopathy, no asymmetry, masses, or scars and thyroid normal to palpation  RESP: lungs clear to auscultation - no rales, rhonchi or wheezes  CV: regular rate and rhythm, normal S1 S2, no S3 or S4, no murmur, click or rub, no peripheral edema and peripheral pulses strong  ABDOMEN: soft, nontender, no hepatosplenomegaly, no masses and bowel sounds normal  MS: no gross musculoskeletal defects noted, no edema  SKIN: no suspicious lesions or rashes  NEURO: Normal strength and tone, sensory exam grossly normal, mentation intact, speech normal, cranial nerves 2-12 intact, DTR's normal and symmetric bilaterally and gait antalgic  PSYCH: mentation appears normal, affect normal/bright              "

## 2022-08-17 ENCOUNTER — HOSPITAL ENCOUNTER (OUTPATIENT)
Dept: CT IMAGING | Facility: CLINIC | Age: 82
Discharge: HOME | End: 2022-08-17
Attending: FAMILY MEDICINE | Admitting: FAMILY MEDICINE
Payer: COMMERCIAL

## 2022-08-17 DIAGNOSIS — R26.81 UNSTEADINESS: ICD-10-CM

## 2022-08-17 PROCEDURE — 70450 CT HEAD/BRAIN W/O DYE: CPT

## 2022-08-18 ENCOUNTER — TELEPHONE (OUTPATIENT)
Dept: FAMILY MEDICINE | Facility: CLINIC | Age: 82
End: 2022-08-18

## 2022-08-18 NOTE — TELEPHONE ENCOUNTER
Test Results    Contacts       Type Contact Phone/Fax    08/18/2022 11:43 AM CDT Phone (Incoming) Cristian Bridges (Self) [phone] (H)          Who ordered the test:  Rupinder    Type of test: Lab and CT    Date of test:  8/17/22    Where was the test performed:  [place]     What are your questions/concerns?:  Received call from (what pt believes was) radiology on 8/18/22 and was told to contact Dr. Barrett care team, but did not state why. Wondering if results are in?     Okay to leave a detailed message?: Yes at Home number on file [phone] (home)

## 2022-09-21 ENCOUNTER — HOSPITAL ENCOUNTER (OUTPATIENT)
Dept: NUCLEAR MEDICINE | Facility: CLINIC | Age: 82
Setting detail: NUCLEAR MEDICINE
Discharge: HOME | End: 2022-09-21
Attending: FAMILY MEDICINE
Payer: COMMERCIAL

## 2022-09-21 ENCOUNTER — HOSPITAL ENCOUNTER (OUTPATIENT)
Dept: CARDIOLOGY | Facility: CLINIC | Age: 82
Discharge: HOME | End: 2022-09-21
Attending: FAMILY MEDICINE
Payer: COMMERCIAL

## 2022-09-21 DIAGNOSIS — R06.02 SOB (SHORTNESS OF BREATH): ICD-10-CM

## 2022-09-21 LAB
CV BLOOD PRESSURE: 65 MMHG
CV STRESS MAX HR HE: 109
LVEF ECHO: NORMAL
NUC STRESS EJECTION FRACTION: 60 %
RATE PRESSURE PRODUCT: NORMAL
STRESS ECHO BASELINE DIASTOLIC HE: 72
STRESS ECHO BASELINE HR: 64 BPM
STRESS ECHO BASELINE SYSTOLIC BP: 130
STRESS ECHO CALCULATED PERCENT HR: 80 %
STRESS ECHO LAST STRESS DIASTOLIC BP: 62
STRESS ECHO LAST STRESS SYSTOLIC BP: 154
STRESS ECHO TARGET HR: 136

## 2022-09-21 PROCEDURE — 93017 CV STRESS TEST TRACING ONLY: CPT

## 2022-09-21 PROCEDURE — 93016 CV STRESS TEST SUPVJ ONLY: CPT | Performed by: INTERNAL MEDICINE

## 2022-09-21 PROCEDURE — 250N000011 HC RX IP 250 OP 636: Performed by: FAMILY MEDICINE

## 2022-09-21 PROCEDURE — 78452 HT MUSCLE IMAGE SPECT MULT: CPT | Mod: 26 | Performed by: INTERNAL MEDICINE

## 2022-09-21 PROCEDURE — 999N000208 ECHOCARDIOGRAM COMPLETE

## 2022-09-21 PROCEDURE — A9502 TC99M TETROFOSMIN: HCPCS | Performed by: FAMILY MEDICINE

## 2022-09-21 PROCEDURE — 255N000002 HC RX 255 OP 636: Performed by: FAMILY MEDICINE

## 2022-09-21 PROCEDURE — 93306 TTE W/DOPPLER COMPLETE: CPT | Mod: 26 | Performed by: INTERNAL MEDICINE

## 2022-09-21 PROCEDURE — 78452 HT MUSCLE IMAGE SPECT MULT: CPT

## 2022-09-21 PROCEDURE — 343N000001 HC RX 343: Performed by: FAMILY MEDICINE

## 2022-09-21 PROCEDURE — 93018 CV STRESS TEST I&R ONLY: CPT | Performed by: INTERNAL MEDICINE

## 2022-09-21 RX ORDER — REGADENOSON 0.08 MG/ML
0.4 INJECTION, SOLUTION INTRAVENOUS ONCE
Status: COMPLETED | OUTPATIENT
Start: 2022-09-21 | End: 2022-09-21

## 2022-09-21 RX ADMIN — TETROFOSMIN 10.5 MCI.: 1.38 INJECTION, POWDER, LYOPHILIZED, FOR SOLUTION INTRAVENOUS at 12:08

## 2022-09-21 RX ADMIN — REGADENOSON 0.4 MG: 0.08 INJECTION, SOLUTION INTRAVENOUS at 13:23

## 2022-09-21 RX ADMIN — HUMAN ALBUMIN MICROSPHERES AND PERFLUTREN 2 ML: 10; .22 INJECTION, SOLUTION INTRAVENOUS at 11:21

## 2022-09-21 RX ADMIN — TETROFOSMIN 33 MCI.: 1.38 INJECTION, POWDER, LYOPHILIZED, FOR SOLUTION INTRAVENOUS at 13:27

## 2022-10-25 ENCOUNTER — OFFICE VISIT (OUTPATIENT)
Dept: CARDIOLOGY | Facility: CLINIC | Age: 82
End: 2022-10-25
Attending: FAMILY MEDICINE
Payer: COMMERCIAL

## 2022-10-25 ENCOUNTER — HOSPITAL ENCOUNTER (OUTPATIENT)
Dept: CARDIOLOGY | Facility: CLINIC | Age: 82
Discharge: HOME | End: 2022-10-25
Attending: INTERNAL MEDICINE | Admitting: INTERNAL MEDICINE
Payer: COMMERCIAL

## 2022-10-25 VITALS
SYSTOLIC BLOOD PRESSURE: 126 MMHG | HEART RATE: 75 BPM | OXYGEN SATURATION: 98 % | DIASTOLIC BLOOD PRESSURE: 64 MMHG | BODY MASS INDEX: 31.57 KG/M2 | WEIGHT: 220 LBS

## 2022-10-25 DIAGNOSIS — I48.91 ATRIAL FIBRILLATION, UNSPECIFIED TYPE (H): ICD-10-CM

## 2022-10-25 PROCEDURE — 99204 OFFICE O/P NEW MOD 45 MIN: CPT | Performed by: INTERNAL MEDICINE

## 2022-10-25 PROCEDURE — 93242 EXT ECG>48HR<7D RECORDING: CPT

## 2022-10-25 PROCEDURE — 93244 EXT ECG>48HR<7D REV&INTERPJ: CPT | Performed by: INTERNAL MEDICINE

## 2022-10-25 RX ORDER — SODIUM PHOSPHATE,MONO-DIBASIC 19G-7G/118
1 ENEMA (ML) RECTAL DAILY
COMMUNITY

## 2022-10-25 RX ORDER — FUROSEMIDE 20 MG
20 TABLET ORAL DAILY
Qty: 30 TABLET | Refills: 0 | Status: SHIPPED | OUTPATIENT
Start: 2022-10-25 | End: 2022-12-01

## 2022-10-25 RX ORDER — PILOCARPINE HYDROCHLORIDE 10 MG/ML
1 SOLUTION/ DROPS OPHTHALMIC 2 TIMES DAILY
COMMUNITY
Start: 2022-10-06

## 2022-10-25 NOTE — LETTER
10/25/2022    Physician No Ref-Primary  No address on file    RE: Cristian Bridges       Dear Colleague,     I had the pleasure of seeing [facility] in the [facility].  Electrophysiology/ Clinic Note         H&P and Plan:     REASON FOR VISIT: Electrophysiology evaluation.      HISTORY OF PRESENT ILLNESS:Mr. Bridges is a delightful 82-year-old gentleman with a history of glaucoma, subdural hematoma and permanent atrial fibrillation who is here for evaluation.     I saw patient back in 2017.  At that time, he presented with a spontaneous right subdural hematoma, which required craniectomy/hematoma evacuation.  I offered watchman procedure, but he was not interested in having procedure done.      This year, he was evaluated by PCP.  He had complains of dyspnea on exertion, which he believes started since a year or so ago.  He underwent an extensive work-up including echocardiogram and a stress test (details below).  He was then referred here for evaluation.      Today, he informs he is doing well.  He has no sensation of palpitation.  But he complains of fatigue and dyspnea on major exertion.  He denies near-syncope or syncope.      Recent labs showed normal BMP and CBC.  proBNP was also normal.     PREVIOUS STUDIES (personally reviewed):  -Echo (9/21/2022): EF of 55-60%.  Moderate MR.  -NM stress test (9/21/2022): Negative for ischemia.      ASSESSMENT AND PLAN:   1.  Permanent atrial fibrillation/dyspnea on exertion.  proBNP was normal.  However, his symptoms are suggestive of preserved EF heart failure.  It is possible the A. fib is also not well controlled.  I recommend the following:  -Zio patch monitor for week.  -Lasix 20 mg daily.  If he continues to have dyspnea on exertion despite of Lasix after 1 week of therapy, I recommend discontinuing medication.  -Follow-up in clinic in a month with an LEXA to discuss monitor results and reevaluate fluid status.  -Consider WHITNEY to evaluate mitral  valve disease if symptoms are persistent.    2.  Embolic prevention.  CHADS-VASc score of3.  On chronic therapy with baby aspirin.  Declined watchman in the past.  3.  Aortic root dilation.    4 cm in the most recent echo, which seems to be stable.    David Diop MD    Physical Exam:  Vitals: /64   Pulse 75   Wt 99.8 kg (220 lb)   SpO2 98%   BMI 31.57 kg/m      Constitutional:  AAO x3.  Pt is in NAD.  HEAD: normocephalic.  SKIN: Skin normal color, texture and turgor with no lesions or eruptions.  Eyes: PERRL, EOMI.  ENT:  Supple, normal JVP. No lymphadenopathy or thyroid enlargement.  Chest:  CTAB.  Cardiac:  IIR, variable sound of S1 and Normal S2.  No murmurs rubs or gallop.    Abdomen:  Normal BS.  Soft, non-tender and non-distended.  No rebound or guarding.    Extremities:  Pedious pulses palpable B/L.  LE edema noticed.   Neurological: Strength and sensation grossly symmetric and intact throughout.       CURRENT MEDICATIONS:  Current Outpatient Medications   Medication Sig Dispense Refill     aspirin 81 MG tablet Take 1 tablet (81 mg) by mouth daily  0     Bimatoprost (LUMIGAN OP) Place 1 drop into both eyes every evening        dorzolamide-timolol (COSOPT) 2-0.5 % ophthalmic solution 1 drop 2 times daily       multivitamin w/minerals (THERA-VIT-M) tablet Take 1 tablet by mouth daily         ALLERGIES     Allergies   Allergen Reactions     Nka [No Known Allergies]        PAST MEDICAL HISTORY:  Past Medical History:   Diagnosis Date     Irregular heart beat        PAST SURGICAL HISTORY:  Past Surgical History:   Procedure Laterality Date     APPENDECTOMY  1947     CRANIOTOMY, EVACUATE HEMATOMA SUBDURAL, COMBINED Right 2/4/2016    Procedure: COMBINED CRANIOTOMY, EVACUATE HEMATOMA SUBDURAL;  Surgeon: Giovanni Caputo MD;  Location:  OR     [address]     EYE SURGERY       ORTHOPEDIC SURGERY       PHACOEMULSIFICATION WITH STANDARD INTRAOCULAR LENS IMPLANT Left 9/21/2015    Procedure:  PHACOEMULSIFICATION WITH STANDARD INTRAOCULAR LENS IMPLANT;  Surgeon: Cristobal Banks MD;  Location: [facility] OR     PHACOEMULSIFICATION WITH STANDARD INTRAOCULAR LENS IMPLANT Right 10/5/2015    Procedure: PHACOEMULSIFICATION WITH STANDARD INTRAOCULAR LENS IMPLANT;  Surgeon: Cristobal Banks MD;  Location: [facility] OR     RELEASE CARPAL TUNNEL Left 2016    Procedure: RELEASE CARPAL TUNNEL;  Surgeon: Joshua Barba MD;  Location: [facility] OR       FAMILY HISTORY:  The patient's family history was reviewed and is non-contributory for heart disease.    SOCIAL HISTORY:  Social History     Socioeconomic History     Marital status:    Tobacco Use     Smoking status: Former     Types: Cigarettes     Quit date: 1975     Years since quittin.4     Smokeless tobacco: Never   Other Topics Concern      Service No     Blood Transfusions No     Caffeine Concern No     Occupational Exposure No     Hobby Hazards No     Sleep Concern No     Stress Concern No     Weight Concern No     Special Diet No     Back Care No     Exercise No     Self-Exams Yes       Review of Systems:  Skin:        Eyes:       ENT:       Respiratory:       Cardiovascular:       Gastroenterology:      Genitourinary:       Musculoskeletal:       Neurologic:       Psychiatric:       Heme/Lymph/Imm:       Endocrine:           Recent Lab Results:  LIPID RESULTS:  No results found for: CHOL, HDL, LDL, TRIG, CHOLHDLRATIO    LIVER ENZYME RESULTS:  Lab Results   Component Value Date    AST 17 2017    ALT 21 2017       CBC RESULTS:  Lab Results   Component Value Date    WBC 7.7 2022    WBC 8.4 2017    RBC 4.48 2022    RBC 4.26 (L) 2017    HGB 14.0 2022    HGB 12.6 (L) 2017    HCT 41.7 2022    HCT 39.3 (L) 2017    MCV 93 2022    MCV 92 2017    MCH 31.3 2022    MCH 29.6 2017    MCHC 33.6 2022    MCHC 32.1 2017    RDW 12.8 2022    RDW 12.5 2017      (L) 08/16/2022     05/08/2017       BMP RESULTS:  Lab Results   Component Value Date     08/16/2022     05/08/2017    POTASSIUM 4.4 08/16/2022    POTASSIUM 4.1 05/08/2017    CHLORIDE 108 08/16/2022    CHLORIDE 103 05/08/2017    CO2 28 08/16/2022    CO2 27 05/08/2017    ANIONGAP 5 08/16/2022    ANIONGAP 8 05/08/2017     (H) 08/16/2022     (H) 05/08/2017    BUN 14 08/16/2022    BUN 15 05/08/2017    CR 0.68 08/16/2022    CR 0.77 05/08/2017    GFRESTIMATED >90 08/16/2022    GFRESTIMATED >90  Non  GFR Calc   05/08/2017    GFRESTBLACK >90   GFR Calc   05/08/2017    BARRY 9.1 08/16/2022    BARRY 8.9 05/08/2017        A1C RESULTS:  No results found for: A1C    INR RESULTS:  Lab Results   Component Value Date    INR 1.31 (H) 02/04/2016    INR 1.37 (H) 02/04/2016         ECHOCARDIOGRAM  No results found for this or any previous visit (from the past 8760 hour(s)).      No orders of the defined types were placed in this encounter.    No orders of the defined types were placed in this encounter.    There are no discontinued medications.      Encounter Diagnosis   Name Primary?     Atrial fibrillation, unspecified type (H)          CC  Dev Bucio MD  [address] [place] [id number]    Thank you for allowing me to participate in the care of your patient.      Sincerely,     David Diop MD     [facility]

## 2022-10-25 NOTE — PROGRESS NOTES
Electrophysiology/ Clinic Note         H&P and Plan:     REASON FOR VISIT: Electrophysiology evaluation.      HISTORY OF PRESENT ILLNESS:Mr. Bridges is a delightful 82-year-old gentleman with a history of glaucoma, subdural hematoma and permanent atrial fibrillation who is here for evaluation.     I saw patient back in 2017.  At that time, he presented with a spontaneous right subdural hematoma, which required craniectomy/hematoma evacuation.  I offered watchman procedure, but he was not interested in having procedure done.      This year, he was evaluated by PCP.  He had complains of dyspnea on exertion, which he believes started since a year or so ago.  He underwent an extensive work-up including echocardiogram and a stress test (details below).  He was then referred here for evaluation.      Today, he informs he is doing well.  He has no sensation of palpitation.  But he complains of fatigue and dyspnea on major exertion.  He denies near-syncope or syncope.      Recent labs showed normal BMP and CBC.  proBNP was also normal.     PREVIOUS STUDIES (personally reviewed):  -Echo (9/21/2022): EF of 55-60%.  Moderate MR.  -NM stress test (9/21/2022): Negative for ischemia.      ASSESSMENT AND PLAN:   1.  Permanent atrial fibrillation/dyspnea on exertion.  proBNP was normal.  However, his symptoms are suggestive of preserved EF heart failure.  It is possible the A. fib is also not well controlled.  I recommend the following:  -Zio patch monitor for week.  -Lasix 20 mg daily.  If he continues to have dyspnea on exertion despite of Lasix after 1 week of therapy, I recommend discontinuing medication.  -Follow-up in clinic in a month with an LEXA to discuss monitor results and reevaluate fluid status.  -Consider WHITNEY to evaluate mitral valve disease if symptoms are persistent.    2.  Embolic prevention.  CHADS-VASc score of3.  On chronic therapy with baby aspirin.  Declined watchman in the past.  3.  Aortic root dilation.    4  cm in the most recent echo, which seems to be stable.    David Diop MD    Physical Exam:  Vitals: /64   Pulse 75   Wt 99.8 kg (220 lb)   SpO2 98%   BMI 31.57 kg/m      Constitutional:  AAO x3.  Pt is in NAD.  HEAD: normocephalic.  SKIN: Skin normal color, texture and turgor with no lesions or eruptions.  Eyes: PERRL, EOMI.  ENT:  Supple, normal JVP. No lymphadenopathy or thyroid enlargement.  Chest:  CTAB.  Cardiac:  IIR, variable sound of S1 and Normal S2.  No murmurs rubs or gallop.    Abdomen:  Normal BS.  Soft, non-tender and non-distended.  No rebound or guarding.    Extremities:  Pedious pulses palpable B/L.  LE edema noticed.   Neurological: Strength and sensation grossly symmetric and intact throughout.       CURRENT MEDICATIONS:  Current Outpatient Medications   Medication Sig Dispense Refill     aspirin 81 MG tablet Take 1 tablet (81 mg) by mouth daily  0     Bimatoprost (LUMIGAN OP) Place 1 drop into both eyes every evening        dorzolamide-timolol (COSOPT) 2-0.5 % ophthalmic solution 1 drop 2 times daily       multivitamin w/minerals (THERA-VIT-M) tablet Take 1 tablet by mouth daily         ALLERGIES     Allergies   Allergen Reactions     Nka [No Known Allergies]        PAST MEDICAL HISTORY:  Past Medical History:   Diagnosis Date     Irregular heart beat        PAST SURGICAL HISTORY:  Past Surgical History:   Procedure Laterality Date     APPENDECTOMY  1947     CRANIOTOMY, EVACUATE HEMATOMA SUBDURAL, COMBINED Right 2/4/2016    Procedure: COMBINED CRANIOTOMY, EVACUATE HEMATOMA SUBDURAL;  Surgeon: Giovanni Caputo MD;  Location:  OR     [address]     EYE SURGERY       ORTHOPEDIC SURGERY       PHACOEMULSIFICATION WITH STANDARD INTRAOCULAR LENS IMPLANT Left 9/21/2015    Procedure: PHACOEMULSIFICATION WITH STANDARD INTRAOCULAR LENS IMPLANT;  Surgeon: Cristobal Banks MD;  Location: [facility] OR     PHACOEMULSIFICATION WITH STANDARD INTRAOCULAR LENS IMPLANT Right 10/5/2015     Procedure: PHACOEMULSIFICATION WITH STANDARD INTRAOCULAR LENS IMPLANT;  Surgeon: Cristobal Banks MD;  Location: [facility] OR     RELEASE CARPAL TUNNEL Left 2016    Procedure: RELEASE CARPAL TUNNEL;  Surgeon: Joshua Barba MD;  Location: [facility] OR       FAMILY HISTORY:  The patient's family history was reviewed and is non-contributory for heart disease.    SOCIAL HISTORY:  Social History     Socioeconomic History     Marital status:    Tobacco Use     Smoking status: Former     Types: Cigarettes     Quit date: 1975     Years since quittin.4     Smokeless tobacco: Never   Other Topics Concern      Service No     Blood Transfusions No     Caffeine Concern No     Occupational Exposure No     Hobby Hazards No     Sleep Concern No     Stress Concern No     Weight Concern No     Special Diet No     Back Care No     Exercise No     Self-Exams Yes       Review of Systems:  Skin:        Eyes:       ENT:       Respiratory:       Cardiovascular:       Gastroenterology:      Genitourinary:       Musculoskeletal:       Neurologic:       Psychiatric:       Heme/Lymph/Imm:       Endocrine:           Recent Lab Results:  LIPID RESULTS:  No results found for: CHOL, HDL, LDL, TRIG, CHOLHDLRATIO    LIVER ENZYME RESULTS:  Lab Results   Component Value Date    AST 17 2017    ALT 21 2017       CBC RESULTS:  Lab Results   Component Value Date    WBC 7.7 2022    WBC 8.4 2017    RBC 4.48 2022    RBC 4.26 (L) 2017    HGB 14.0 2022    HGB 12.6 (L) 2017    HCT 41.7 2022    HCT 39.3 (L) 2017    MCV 93 2022    MCV 92 2017    MCH 31.3 2022    MCH 29.6 2017    MCHC 33.6 2022    MCHC 32.1 2017    RDW 12.8 2022    RDW 12.5 2017     (L) 2022     2017       BMP RESULTS:  Lab Results   Component Value Date     2022     2017    POTASSIUM 4.4 2022    POTASSIUM 4.1  05/08/2017    CHLORIDE 108 08/16/2022    CHLORIDE 103 05/08/2017    CO2 28 08/16/2022    CO2 27 05/08/2017    ANIONGAP 5 08/16/2022    ANIONGAP 8 05/08/2017     (H) 08/16/2022     (H) 05/08/2017    BUN 14 08/16/2022    BUN 15 05/08/2017    CR 0.68 08/16/2022    CR 0.77 05/08/2017    GFRESTIMATED >90 08/16/2022    GFRESTIMATED >90  Non  GFR Calc   05/08/2017    GFRESTBLACK >90   GFR Calc   05/08/2017    BARRY 9.1 08/16/2022    BARRY 8.9 05/08/2017        A1C RESULTS:  No results found for: A1C    INR RESULTS:  Lab Results   Component Value Date    INR 1.31 (H) 02/04/2016    INR 1.37 (H) 02/04/2016         ECHOCARDIOGRAM  No results found for this or any previous visit (from the past 8760 hour(s)).      No orders of the defined types were placed in this encounter.    No orders of the defined types were placed in this encounter.    There are no discontinued medications.      Encounter Diagnosis   Name Primary?     Atrial fibrillation, unspecified type (H)          CC  Dev Bucio MD  [address] [place] [id number]

## 2022-12-01 ENCOUNTER — OFFICE VISIT (OUTPATIENT)
Dept: CARDIOLOGY | Facility: CLINIC | Age: 82
End: 2022-12-01
Attending: INTERNAL MEDICINE
Payer: COMMERCIAL

## 2022-12-01 VITALS
OXYGEN SATURATION: 96 % | HEART RATE: 63 BPM | SYSTOLIC BLOOD PRESSURE: 133 MMHG | BODY MASS INDEX: 31.8 KG/M2 | WEIGHT: 221.6 LBS | DIASTOLIC BLOOD PRESSURE: 78 MMHG

## 2022-12-01 DIAGNOSIS — I77.810 DILATATION OF THORACIC AORTA (H): ICD-10-CM

## 2022-12-01 DIAGNOSIS — I34.0 MITRAL VALVE INSUFFICIENCY, UNSPECIFIED ETIOLOGY: ICD-10-CM

## 2022-12-01 DIAGNOSIS — R06.09 DOE (DYSPNEA ON EXERTION): Primary | ICD-10-CM

## 2022-12-01 DIAGNOSIS — I48.21 PERMANENT ATRIAL FIBRILLATION (H): ICD-10-CM

## 2022-12-01 PROCEDURE — 99214 OFFICE O/P EST MOD 30 MIN: CPT | Performed by: NURSE PRACTITIONER

## 2022-12-01 NOTE — PATIENT INSTRUCTIONS
Medication Changes:  None     Recommendations:  Check daily weights and call the clinic if your weight has increased more than 2 lbs in one day or 5 lbs in one week; if you feel more short of breath or have worsening swelling in your legs or abdomen.     Follow-up:  Follow-up with primary care about shortness of breath with exertion to see if they want lung function testing. If lungs are ok, let us know if you want to consider echo down the throat to look at your heart valve.   See Dr. Diop for cardiology follow up at [facility]:  Dec 2023.   Call 6 months prior, to schedule.     Cardiology Scheduling~[phone]  Cardiology Clinic RN~[phone] (Ana RN, Alexa RN, Jayna RN)

## 2022-12-01 NOTE — LETTER
12/1/2022    Physician No Ref-Primary  No address on file    RE: Cristian Bridges       Dear Colleague,     I had the pleasure of seeing Cristian Bridges in the [facility].  Cardiology Clinic Progress Note  Cristian Bridges MRN# [id number]   YOB: 1940 Age: 82 year old      Primary Cardiologist:   Dr. Diop          History of Presenting Illness:      Cristian Bridges is a pleasant 82 year old patient with a past cardiac history significant for   1. Permanent atrial fibrillation    Auto rate controlled without medications    Unable to tolerate anticoagulation due to spontaneous subdural hematoma in 2017    Declined watchman  2. Mitral insufficiency  3. Thoracic aortic dilatation    Aortic root 4 cm 9/2022 echo-stable  Past medical history significant for glaucoma, spontaneous subdural hematoma 2017      Patient was seen by Dr. Diop in October 2022.  He had complaints of dyspnea on exertion over the prior year.  Echocardiogram 9/2022 showed EF 55 to 60% with moderate MR.  Nuclear stress test 9/2022 was negative for ischemia.  BNP was WNL.  He recommended Zio patch and a trial of Lasix.  If symptoms persisted, consider WHITNEY to evaluate mitral disease if patient continues with fatigue and dyspnea with significant exertion.    Pt presents today, with his wife, for 1 month testing follow-up. 1 week Zio patch showed atrial fibrillation with controlled rates and a few pauses in the early morning up to 4.2 seconds.  This was reviewed by Dr. Diop who noted that the patient is not on any AV jessica agents and recommended conservative approach.  Results reviewed today.     He has not noticed any improvement in his dyspnea on exertion, after 1 week trial of Lasix.  He continues with bilateral lower extremity edema to shelter up the shin.  We discussed compression stockings.  Blood pressure is controlled.  Weights have been stable.  Overall, he feels his dyspnea on exertion has been pretty stable over  the prior year.  He does not wish to proceed with WHITNEY at this time.  He prefers to follow-up with PCP. Patient reports no chest pain, PND, orthopnea, presyncope, syncope, edema, heart racing, or palpitations.      Labs:  LIPID RESULTS:  No results found for: CHOL, HDL, LDL, TRIG, CHOLHDLRATIO    LIVER ENZYME RESULTS:  Lab Results   Component Value Date    AST 17 05/08/2017    ALT 21 05/08/2017       CBC RESULTS:  Lab Results   Component Value Date    WBC 7.7 08/16/2022    WBC 8.4 05/08/2017    RBC 4.48 08/16/2022    RBC 4.26 (L) 05/08/2017    HGB 14.0 08/16/2022    HGB 12.6 (L) 05/08/2017    HCT 41.7 08/16/2022    HCT 39.3 (L) 05/08/2017    MCV 93 08/16/2022    MCV 92 05/08/2017    MCH 31.3 08/16/2022    MCH 29.6 05/08/2017    MCHC 33.6 08/16/2022    MCHC 32.1 05/08/2017    RDW 12.8 08/16/2022    RDW 12.5 05/08/2017     (L) 08/16/2022     05/08/2017       BMP RESULTS:  Lab Results   Component Value Date     08/16/2022     05/08/2017    POTASSIUM 4.4 08/16/2022    POTASSIUM 4.1 05/08/2017    CHLORIDE 108 08/16/2022    CHLORIDE 103 05/08/2017    CO2 28 08/16/2022    CO2 27 05/08/2017    ANIONGAP 5 08/16/2022    ANIONGAP 8 05/08/2017     (H) 08/16/2022     (H) 05/08/2017    BUN 14 08/16/2022    BUN 15 05/08/2017    CR 0.68 08/16/2022    CR 0.77 05/08/2017    GFRESTIMATED >90 08/16/2022    GFRESTIMATED >90  Non  GFR Calc   05/08/2017    GFRESTBLACK >90   GFR Calc   05/08/2017    BARRY 9.1 08/16/2022    BARRY 8.9 05/08/2017        A1C RESULTS:  No results found for: A1C    INR RESULTS:  Lab Results   Component Value Date    INR 1.31 (H) 02/04/2016    INR 1.37 (H) 02/04/2016       Results reviewed today.       Current Cardiac Medications     Aspirin 81 mg daily  Lasix 20 mg daily-patient not taking                   Assessment and Plan:       Plan    Patient Instructions   Medication Changes:  4. None     Recommendations:  1. Check daily weights and call  the clinic if your weight has increased more than 2 lbs in one day or 5 lbs in one week; if you feel more short of breath or have worsening swelling in your legs or abdomen.     Follow-up:  1. Follow-up with primary care about shortness of breath with exertion to see if they want lung function testing. If lungs are ok, let us know if you want to consider echo down the throat to look at your heart valve.   2. See Dr. Diop for cardiology follow up at [facility]:  Dec 2023.   Call 6 months prior, to schedule.     Cardiology Scheduling~[phone]  Cardiology Clinic RN~[phone] (Ana RN, Alexa RN, Jayna RN)          1. Permanent atrial fibrillation    Asymptomatic    Elevated [id number] score    Unable to tolerate anticoagulation due to subdural hematoma    Declined watchman    Auto rate controlled without AV jessica blocking agents    4.2 pauses in the early morning hours-  will monitor      2. Mitral insufficiency    Moderate on echo 9/2022    Follow with echo    Consider WHITNEY       3. Thoracic aortic dilatation    Stable 9/2022    Follow with echo               Thank you for allowing me to participate in this delightful patient's care.      This note was completed in part using Dragon voice recognition software. Although reviewed after completion, some word and grammatical errors may occur.    Erlinda Mora, APRN CNP, APRN, CNP           Data:   All laboratory data reviewed             Constitutional:  cooperative, alert and oriented, well developed, well nourished, in no acute distress  overweight    Skin:  warm and dry to the touch         Head:  normocephalic       Eyes:  pupils equal and round       ENT:  no pallor or cyanosis       Neck:  no stiffness       Respiratory:  clear to auscultation; normal symmetry        Cardiac: regular rate and irregularly irregular rhythm; normal S1 and S2                 pulses full and equal     GI:  abdomen soft, nondistended     Extremities and  Muscular Skeletal:   Bilateral lower extremity edema 1+ to detention up the shins       Neurological:  affect appropriate; no gross motor deficits       Psych:  Alert and Oriented x 3 , appropriate affact    Thank you for allowing me to participate in the care of your patient.      Sincerely,     Erlinda Mora, LUDIVINA [facility]  cc:   David Diop MD  [address]  [place] [id number]

## 2022-12-01 NOTE — NURSING NOTE
No chief complaint on file.      There were no vitals filed for this visit.  Wt Readings from Last 1 Encounters:   10/25/22 99.8 kg (220 lb)       Jailene Weber MA

## 2023-01-26 ENCOUNTER — LAB (OUTPATIENT)
Dept: LAB | Facility: CLINIC | Age: 83
End: 2023-01-26
Payer: COMMERCIAL

## 2023-01-26 ENCOUNTER — OFFICE VISIT (OUTPATIENT)
Dept: NEUROLOGY | Facility: CLINIC | Age: 83
End: 2023-01-26
Attending: FAMILY MEDICINE
Payer: COMMERCIAL

## 2023-01-26 VITALS
DIASTOLIC BLOOD PRESSURE: 75 MMHG | RESPIRATION RATE: 16 BRPM | SYSTOLIC BLOOD PRESSURE: 133 MMHG | HEART RATE: 70 BPM | WEIGHT: 220 LBS | BODY MASS INDEX: 31.57 KG/M2

## 2023-01-26 DIAGNOSIS — R73.9 HYPERGLYCEMIA: ICD-10-CM

## 2023-01-26 DIAGNOSIS — R26.81 UNSTEADINESS: ICD-10-CM

## 2023-01-26 DIAGNOSIS — Z86.79 HISTORY OF SUBDURAL HEMATOMA: ICD-10-CM

## 2023-01-26 DIAGNOSIS — G62.9 NEUROPATHY: ICD-10-CM

## 2023-01-26 DIAGNOSIS — G62.9 NEUROPATHY: Primary | ICD-10-CM

## 2023-01-26 LAB
HBA1C MFR BLD: 5.7 % (ref 0–5.6)
TOTAL PROTEIN SERUM FOR ELP: 7.3 G/DL (ref 6.4–8.3)
VIT B12 SERPL-MCNC: 309 PG/ML (ref 232–1245)

## 2023-01-26 PROCEDURE — 84165 PROTEIN E-PHORESIS SERUM: CPT

## 2023-01-26 PROCEDURE — 82607 VITAMIN B-12: CPT

## 2023-01-26 PROCEDURE — 84155 ASSAY OF PROTEIN SERUM: CPT

## 2023-01-26 PROCEDURE — 99205 OFFICE O/P NEW HI 60 MIN: CPT | Performed by: PSYCHIATRY & NEUROLOGY

## 2023-01-26 PROCEDURE — 84425 ASSAY OF VITAMIN B-1: CPT | Mod: 90

## 2023-01-26 PROCEDURE — 83036 HEMOGLOBIN GLYCOSYLATED A1C: CPT

## 2023-01-26 PROCEDURE — 36415 COLL VENOUS BLD VENIPUNCTURE: CPT

## 2023-01-26 PROCEDURE — 99000 SPECIMEN HANDLING OFFICE-LAB: CPT

## 2023-01-26 PROCEDURE — 86334 IMMUNOFIX E-PHORESIS SERUM: CPT

## 2023-01-26 NOTE — PROGRESS NOTES
NEUROLOGY OUTPATIENT CONSULT NOTE   Jan 26, 2023     CHIEF COMPLAINT/REASON FOR VISIT/REASON FOR CONSULT  Patient presents with:  Balance/ Vestibular    REASON FOR CONSULTATION- Unsteadiness     REFERRAL SOURCE  Dr. Dev Bucio  CC Dr. Dev Bucio    HISTORY OF PRESENT ILLNESS  Cristian Bridges is a 83 year old male seen today for evaluation of unsteadiness.  About 4 years ago he had a subdural hematoma related to the use of the blood thinner.  There was no trauma.  He did not need surgery.  There was subsequent resolution of the subdural hematoma.  Over the last couple of years he has been noticing progressive balance issues.  Notices no problems when he sitting in the chair.  Symptoms mainly come on when he is standing.  Does complain of some weakness in his knees.  Does complain of some shortness of breath with stairs.  Does have difficulty getting up chairs.  No numbness in his feet.  No incontinence.  Does have some minor memory issues.  Feels that it might be more related to BPH issues.  Some occasional pain in the neck though nothing currently.  No major back pain.  No issues in the shower or walking in the dark.  He generally does not do these activities.    Previous history is reviewed and this is unchanged.    PAST MEDICAL/SURGICAL HISTORY  Past Medical History:   Diagnosis Date     Irregular heart beat      Patient Active Problem List   Diagnosis     Health Care Home     Senile nuclear sclerosis     Atrial fibrillation, unspecified     Long-term (current) use of anticoagulants [Z79.01]     Atrial fibrillation (HCC) [I48.91]     Subdural hematoma     VELEZ (dyspnea on exertion)     Mitral valve insufficiency, unspecified etiology     Dilatation of thoracic aorta (H)   Significant arthritis, vision loss    FAMILY HISTORY  Family History   Problem Relation Age of Onset     Cerebrovascular Disease Father      Lung Cancer Brother    Significant for stroke and cancer/leukemia    SOCIAL HISTORY  Social  History     Tobacco Use     Smoking status: Former     Types: Cigarettes     Quit date: 1975     Years since quittin.6     Smokeless tobacco: Never       SYSTEMS REVIEW  Twelve-system ROS was done and other than the HPI this was negative except for back pain, arm and leg pain, joint pain, numbness and tingling, difficulty walking, balance coordination problems, ringing in the ears, hearing loss, vision symptoms, cardiac/heart problems, respiratory problems.    MEDICATIONS  aspirin 81 MG tablet, Take 1 tablet (81 mg) by mouth daily  dorzolamide-timolol (COSOPT) 2-0.5 % ophthalmic solution, 1 drop 2 times daily  glucosamine-chondroitin 500-400 MG CAPS per capsule, Take 1 capsule by mouth daily  multivitamin w/minerals (THERA-VIT-M) tablet, Take 1 tablet by mouth daily  pilocarpine (PILOCAR) 1 % ophthalmic solution, Place 1 drop into both eyes 2 times daily    No current facility-administered medications on file prior to visit.       PHYSICAL EXAMINATION  VITALS: /75   Pulse 70   Resp 16   Wt 99.8 kg (220 lb)   BMI 31.57 kg/m    GENERAL: Healthy appearing, alert, no acute distress, normal habitus.  CARDIOVASCULAR: Extremities warm and well perfused. Pulses present.   NEUROLOGICAL:  Patient is awake and oriented to self, place and time.  Attention span is normal.  Memory is grossly intact.  Language is fluent and follows commands appropriately.  Appropriate fund of knowledge. Cranial nerves 2-12 are intact. There is no pronator drift.  Motor exam shows 5/5 strength in all extremities.  Questionable bilateral dorsiflexion weakness.  Tone is symmetric bilaterally in upper and lower extremities.  Reflexes are symmetric and absent in upper extremities and lower extremities. Sensory exam is grossly intact to light touch, pin prick and vibration decreased vibratory sense up to the knees.  Finger to nose and heel to shin is without dysmetria.  Romberg is unsteady/positive.  Gait is wide-based.  Patient is  unable to do tandem walking without difficulty.  There are some difficulty walking on his toes/use.      DIAGNOSTICS  Head CT images reviewed.  No evidence of ongoing subdural hematoma.  IMPRESSION:     1. Previous subdural collection along the right cervical convexity is  no longer present. No new intracranial hemorrhage.  2. No other acute intracranial pathology.     LABS  Component      Latest Ref Rng & Units 8/16/2022   Sodium      133 - 144 mmol/L 141   Potassium      3.4 - 5.3 mmol/L 4.4   Chloride      94 - 109 mmol/L 108   Carbon Dioxide      20 - 32 mmol/L 28   Anion Gap      3 - 14 mmol/L 5   Urea Nitrogen      7 - 30 mg/dL 14   Creatinine      0.66 - 1.25 mg/dL 0.68   Calcium      8.5 - 10.1 mg/dL 9.1   Glucose      70 - 99 mg/dL 124 (H)   GFR Estimate      >60 mL/min/1.73m2 >90   WBC      4.0 - 11.0 10e3/uL 7.7   RBC Count      4.40 - 5.90 10e6/uL 4.48   Hemoglobin      13.3 - 17.7 g/dL 14.0   Hematocrit      40.0 - 53.0 % 41.7   MCV      78 - 100 fL 93   MCH      26.5 - 33.0 pg 31.3   MCHC      31.5 - 36.5 g/dL 33.6   RDW      10.0 - 15.0 % 12.8   Platelet Count      150 - 450 10e3/uL 138 (L)   TSH      0.40 - 4.00 mU/L 0.98   N-Terminal Pro Bnp      0 - 1,800 pg/mL 791   Troponin I High Sensitivity      <79 ng/L 9       OUTSIDE RECORDS  Outside referral notes and chart notes were reviewed and pertinent information has been summarized (in addition to the HPI):-  NSG          IMPRESSION/REPORT/PLAN  Neuropathy  Unsteadiness  History of subdural hematoma    This is a 83 year old male with history of subdural hematoma and unsteadiness.  On exam today he does have decreased vibratory sense in his feet along with absent reflexes.  Exam is consistent with a peripheral neuropathy.  Head CT shows resolution of the subdural hematoma and I do not suspect unsteadiness is related to the subdural hematoma.  Due to presence of underlying neuropathy there could be a cervical or lumbar lesion superimposed also  affecting his gait.  Even though he complains of difficulty getting out of chairs I do not see a lot of hip flexion weakness on exam.    We will start with checking an EMG.  We will also check blood work to look for reversible causes of neuropathy.  Discussed prognosis/signs and symptoms of neuropathy.    I can see him back in 6 weeks.    -     Vitamin B12; Future  -     Vitamin B1 whole blood; Future  -     Protein Immunofixation Serum; Future  -     Protein electrophoresis; Future  -     Hemoglobin A1c; Future  -     EMG; Future    Return in about 6 weeks (around 3/9/2023) for In-Clinic Visit (must), After testing.    Over 60 minutes were spent coordinating the care for the patient on the day of the encounter.  This includes previsit, during visit and post visit activities as documented above.  Counseling patient.  Reviewing head CT and outside records.  Blood work reviewed.  Multiple test ordered.  (Activities include but not inclusive of reviewing chart, reviewing outside records, reviewing labs and imaging study results as well as the images, patient visit time including getting history and exam,  use if applicable, review of test results with the patient and coming up with a plan in a shared model, counseling patient and family, education and answering patient questions, EMR , EMR diagnosis entry and problem list management, medication reconciliation and prescription management if applicable, paperwork if applicable, printing documents and documentation of the visit activities.)        Manoj Root MD  Neurologist  [facility]  Tel:- [phone]    This note was dictated using voice recognition software.  Any grammatical or context distortions are unintentional and inherent to the software.

## 2023-01-26 NOTE — LETTER
1/26/2023         RE: Cristian Bridges  [address]  [place] [address]        Dear Colleague,    Thank you for referring your patient, Cristian Bridges, to the [facility] [place]. Please see a copy of my visit note below.    NEUROLOGY OUTPATIENT CONSULT NOTE   Jan 26, 2023     CHIEF COMPLAINT/REASON FOR VISIT/REASON FOR CONSULT  Patient presents with:  Balance/ Vestibular    REASON FOR CONSULTATION- Unsteadiness     REFERRAL SOURCE  Dr. Dev Bucio  CC Dr. Dev Bucio    HISTORY OF PRESENT ILLNESS  Cristian Bridges is a 83 year old male seen today for evaluation of unsteadiness.  About 4 years ago he had a subdural hematoma related to the use of the blood thinner.  There was no trauma.  He did not need surgery.  There was subsequent resolution of the subdural hematoma.  Over the last couple of years he has been noticing progressive balance issues.  Notices no problems when he sitting in the chair.  Symptoms mainly come on when he is standing.  Does complain of some weakness in his knees.  Does complain of some shortness of breath with stairs.  Does have difficulty getting up chairs.  No numbness in his feet.  No incontinence.  Does have some minor memory issues.  Feels that it might be more related to BPH issues.  Some occasional pain in the neck though nothing currently.  No major back pain.  No issues in the shower or walking in the dark.  He generally does not do these activities.    Previous history is reviewed and this is unchanged.    PAST MEDICAL/SURGICAL HISTORY  Past Medical History:   Diagnosis Date     Irregular heart beat      Patient Active Problem List   Diagnosis     Health Care Home     Senile nuclear sclerosis     Atrial fibrillation, unspecified     Long-term (current) use of anticoagulants [Z79.01]     Atrial fibrillation (HCC) [I48.91]     Subdural hematoma     VELEZ (dyspnea on exertion)     Mitral valve insufficiency, unspecified etiology      Dilatation of thoracic aorta (H)   Significant arthritis, vision loss    FAMILY HISTORY  Family History   Problem Relation Age of Onset     Cerebrovascular Disease Father      Lung Cancer Brother    Significant for stroke and cancer/leukemia    SOCIAL HISTORY  Social History     Tobacco Use     Smoking status: Former     Types: Cigarettes     Quit date: 1975     Years since quittin.6     Smokeless tobacco: Never       SYSTEMS REVIEW  Twelve-system ROS was done and other than the HPI this was negative except for back pain, arm and leg pain, joint pain, numbness and tingling, difficulty walking, balance coordination problems, ringing in the ears, hearing loss, vision symptoms, cardiac/heart problems, respiratory problems.    MEDICATIONS  aspirin 81 MG tablet, Take 1 tablet (81 mg) by mouth daily  dorzolamide-timolol (COSOPT) 2-0.5 % ophthalmic solution, 1 drop 2 times daily  glucosamine-chondroitin 500-400 MG CAPS per capsule, Take 1 capsule by mouth daily  multivitamin w/minerals (THERA-VIT-M) tablet, Take 1 tablet by mouth daily  pilocarpine (PILOCAR) 1 % ophthalmic solution, Place 1 drop into both eyes 2 times daily    No current facility-administered medications on file prior to visit.       PHYSICAL EXAMINATION  VITALS: /75   Pulse 70   Resp 16   Wt 99.8 kg (220 lb)   BMI 31.57 kg/m    GENERAL: Healthy appearing, alert, no acute distress, normal habitus.  CARDIOVASCULAR: Extremities warm and well perfused. Pulses present.   NEUROLOGICAL:  Patient is awake and oriented to self, place and time.  Attention span is normal.  Memory is grossly intact.  Language is fluent and follows commands appropriately.  Appropriate fund of knowledge. Cranial nerves 2-12 are intact. There is no pronator drift.  Motor exam shows 5/5 strength in all extremities.  Questionable bilateral dorsiflexion weakness.  Tone is symmetric bilaterally in upper and lower extremities.  Reflexes are symmetric and absent in upper  extremities and lower extremities. Sensory exam is grossly intact to light touch, pin prick and vibration decreased vibratory sense up to the knees.  Finger to nose and heel to shin is without dysmetria.  Romberg is unsteady/positive.  Gait is wide-based.  Patient is unable to do tandem walking without difficulty.  There are some difficulty walking on his toes/use.      DIAGNOSTICS  Head CT images reviewed.  No evidence of ongoing subdural hematoma.  IMPRESSION:     1. Previous subdural collection along the right cervical convexity is  no longer present. No new intracranial hemorrhage.  2. No other acute intracranial pathology.     LABS  Component      Latest Ref Rng & Units 8/16/2022   Sodium      133 - 144 mmol/L 141   Potassium      3.4 - 5.3 mmol/L 4.4   Chloride      94 - 109 mmol/L 108   Carbon Dioxide      20 - 32 mmol/L 28   Anion Gap      3 - 14 mmol/L 5   Urea Nitrogen      7 - 30 mg/dL 14   Creatinine      0.66 - 1.25 mg/dL 0.68   Calcium      8.5 - 10.1 mg/dL 9.1   Glucose      70 - 99 mg/dL 124 (H)   GFR Estimate      >60 mL/min/1.73m2 >90   WBC      4.0 - 11.0 10e3/uL 7.7   RBC Count      4.40 - 5.90 10e6/uL 4.48   Hemoglobin      13.3 - 17.7 g/dL 14.0   Hematocrit      40.0 - 53.0 % 41.7   MCV      78 - 100 fL 93   MCH      26.5 - 33.0 pg 31.3   MCHC      31.5 - 36.5 g/dL 33.6   RDW      10.0 - 15.0 % 12.8   Platelet Count      150 - 450 10e3/uL 138 (L)   TSH      0.40 - 4.00 mU/L 0.98   N-Terminal Pro Bnp      0 - 1,800 pg/mL 791   Troponin I High Sensitivity      <79 ng/L 9       OUTSIDE RECORDS  Outside referral notes and chart notes were reviewed and pertinent information has been summarized (in addition to the HPI):-  NSG          IMPRESSION/REPORT/PLAN  Neuropathy  Unsteadiness  History of subdural hematoma    This is a 83 year old male with history of subdural hematoma and unsteadiness.  On exam today he does have decreased vibratory sense in his feet along with absent reflexes.  Exam is  consistent with a peripheral neuropathy.  Head CT shows resolution of the subdural hematoma and I do not suspect unsteadiness is related to the subdural hematoma.  Due to presence of underlying neuropathy there could be a cervical or lumbar lesion superimposed also affecting his gait.  Even though he complains of difficulty getting out of chairs I do not see a lot of hip flexion weakness on exam.    We will start with checking an EMG.  We will also check blood work to look for reversible causes of neuropathy.  Discussed prognosis/signs and symptoms of neuropathy.    I can see him back in 6 weeks.    -     Vitamin B12; Future  -     Vitamin B1 whole blood; Future  -     Protein Immunofixation Serum; Future  -     Protein electrophoresis; Future  -     Hemoglobin A1c; Future  -     EMG; Future    Return in about 6 weeks (around 3/9/2023) for In-Clinic Visit (must), After testing.    Over 60 minutes were spent coordinating the care for the patient on the day of the encounter.  This includes previsit, during visit and post visit activities as documented above.  Counseling patient.  Reviewing head CT and outside records.  Blood work reviewed.  Multiple test ordered.  (Activities include but not inclusive of reviewing chart, reviewing outside records, reviewing labs and imaging study results as well as the images, patient visit time including getting history and exam,  use if applicable, review of test results with the patient and coming up with a plan in a shared model, counseling patient and family, education and answering patient questions, EMR , EMR diagnosis entry and problem list management, medication reconciliation and prescription management if applicable, paperwork if applicable, printing documents and documentation of the visit activities.)        Manoj Root MD  Neurologist  [facility]  Tel:- [phone]    This note was dictated using voice  recognition software.  Any grammatical or context distortions are unintentional and inherent to the software.        Again, thank you for allowing me to participate in the care of your patient.        Sincerely,        Manoj Root MD

## 2023-01-27 LAB
ALBUMIN SERPL ELPH-MCNC: 3.8 G/DL (ref 3.7–5.1)
ALPHA1 GLOB SERPL ELPH-MCNC: 0.3 G/DL (ref 0.2–0.4)
ALPHA2 GLOB SERPL ELPH-MCNC: 0.8 G/DL (ref 0.5–0.9)
B-GLOBULIN SERPL ELPH-MCNC: 1 G/DL (ref 0.6–1)
GAMMA GLOB SERPL ELPH-MCNC: 1.5 G/DL (ref 0.7–1.6)
M PROTEIN SERPL ELPH-MCNC: 0 G/DL
PROT PATTERN SERPL ELPH-IMP: NORMAL
PROT PATTERN SERPL IFE-IMP: NORMAL

## 2023-02-03 ENCOUNTER — TELEPHONE (OUTPATIENT)
Dept: NEUROLOGY | Facility: CLINIC | Age: 83
End: 2023-02-03
Payer: COMMERCIAL

## 2023-02-03 LAB — VIT B1 PYROPHOSHATE BLD-SCNC: 143 NMOL/L

## 2023-02-03 NOTE — TELEPHONE ENCOUNTER
----- Message from Manoj Root MD sent at 2/3/2023 10:03 AM CST -----  The blood test shows low vitamin B12 and was positive for prediabetes.  I would recommend taking an over-the-counter 1000 mcg of vitamin B12 a day supplementation to see if that helps with your symptoms.  Would recommend exercise and improving diet to help with the prediabetes.

## 2023-02-03 NOTE — TELEPHONE ENCOUNTER
[facility] Call Center    Phone Message    May a detailed message be left on voicemail: yes     Reason for Call:     Patient is returning a missed call to care team, please call pt back at [phone]    Action Taken: Other: [facility]    Travel Screening: Not Applicable

## 2023-03-16 ENCOUNTER — TELEPHONE (OUTPATIENT)
Dept: NEUROLOGY | Facility: CLINIC | Age: 83
End: 2023-03-16
Payer: COMMERCIAL

## 2023-03-16 NOTE — TELEPHONE ENCOUNTER
[facility] Call Center    Phone Message    May a detailed message be left on voicemail: yes     Reason for Call: Appointment Intake    Referring Provider Name: Manoj Root  Diagnosis and/or Symptoms: EMG    Patient is requesting a call back to reschedule his EMG appt per clinic request. Please call pt back to reschedule due to writer unable to due to protocols.         Action Taken: Message routed to:  Other: [facility]     Travel Screening: Not Applicable

## 2023-04-11 NOTE — PROGRESS NOTES
Cardiology Clinic Progress Note  Cristian Bridges MRN# [id number]   YOB: 1940 Age: 82 year old      Primary Cardiologist:   Dr. Diop          History of Presenting Illness:      Cristian Bridges is a pleasant 82 year old patient with a past cardiac history significant for   1. Permanent atrial fibrillation    Auto rate controlled without medications    Unable to tolerate anticoagulation due to spontaneous subdural hematoma in 2017    Declined watchman  2. Mitral insufficiency  3. Thoracic aortic dilatation    Aortic root 4 cm 9/2022 echo-stable  Past medical history significant for glaucoma, spontaneous subdural hematoma 2017      Patient was seen by Dr. Diop in October 2022.  He had complaints of dyspnea on exertion over the prior year.  Echocardiogram 9/2022 showed EF 55 to 60% with moderate MR.  Nuclear stress test 9/2022 was negative for ischemia.  BNP was WNL.  He recommended Zio patch and a trial of Lasix.  If symptoms persisted, consider WHITNEY to evaluate mitral disease if patient continues with fatigue and dyspnea with significant exertion.    Pt presents today, with his wife, for 1 month testing follow-up. 1 week Zio patch showed atrial fibrillation with controlled rates and a few pauses in the early morning up to 4.2 seconds.  This was reviewed by Dr. Diop who noted that the patient is not on any AV jessica agents and recommended conservative approach.  Results reviewed today.     He has not noticed any improvement in his dyspnea on exertion, after 1 week trial of Lasix.  He continues with bilateral lower extremity edema to USP up the shin.  We discussed compression stockings.  Blood pressure is controlled.  Weights have been stable.  Overall, he feels his dyspnea on exertion has been pretty stable over the prior year.  He does not wish to proceed with WHITNEY at this time.  He prefers to follow-up with PCP. Patient reports no chest pain, PND, orthopnea, presyncope, syncope, edema, heart  racing, or palpitations.      Labs:  LIPID RESULTS:  No results found for: CHOL, HDL, LDL, TRIG, CHOLHDLRATIO    LIVER ENZYME RESULTS:  Lab Results   Component Value Date    AST 17 05/08/2017    ALT 21 05/08/2017       CBC RESULTS:  Lab Results   Component Value Date    WBC 7.7 08/16/2022    WBC 8.4 05/08/2017    RBC 4.48 08/16/2022    RBC 4.26 (L) 05/08/2017    HGB 14.0 08/16/2022    HGB 12.6 (L) 05/08/2017    HCT 41.7 08/16/2022    HCT 39.3 (L) 05/08/2017    MCV 93 08/16/2022    MCV 92 05/08/2017    MCH 31.3 08/16/2022    MCH 29.6 05/08/2017    MCHC 33.6 08/16/2022    MCHC 32.1 05/08/2017    RDW 12.8 08/16/2022    RDW 12.5 05/08/2017     (L) 08/16/2022     05/08/2017       BMP RESULTS:  Lab Results   Component Value Date     08/16/2022     05/08/2017    POTASSIUM 4.4 08/16/2022    POTASSIUM 4.1 05/08/2017    CHLORIDE 108 08/16/2022    CHLORIDE 103 05/08/2017    CO2 28 08/16/2022    CO2 27 05/08/2017    ANIONGAP 5 08/16/2022    ANIONGAP 8 05/08/2017     (H) 08/16/2022     (H) 05/08/2017    BUN 14 08/16/2022    BUN 15 05/08/2017    CR 0.68 08/16/2022    CR 0.77 05/08/2017    GFRESTIMATED >90 08/16/2022    GFRESTIMATED >90  Non  GFR Calc   05/08/2017    GFRESTBLACK >90   GFR Calc   05/08/2017    BARRY 9.1 08/16/2022    BARRY 8.9 05/08/2017        A1C RESULTS:  No results found for: A1C    INR RESULTS:  Lab Results   Component Value Date    INR 1.31 (H) 02/04/2016    INR 1.37 (H) 02/04/2016       Results reviewed today.       Current Cardiac Medications     Aspirin 81 mg daily  Lasix 20 mg daily-patient not taking                   Assessment and Plan:       Plan    Patient Instructions   Medication Changes:  4. None     Recommendations:  1. Check daily weights and call the clinic if your weight has increased more than 2 lbs in one day or 5 lbs in one week; if you feel more short of breath or have worsening swelling in your legs or abdomen.      Follow-up:  1. Follow-up with primary care about shortness of breath with exertion to see if they want lung function testing. If lungs are ok, let us know if you want to consider echo down the throat to look at your heart valve.   2. See Dr. Diop for cardiology follow up at [facility]:  Dec 2023.   Call 6 months prior, to schedule.     Cardiology Scheduling~[phone]  Cardiology Clinic RN~[phone] (Ana RN, Alexa RN, Jayna RN)          1. Permanent atrial fibrillation    Asymptomatic    Elevated [id number] score    Unable to tolerate anticoagulation due to subdural hematoma    Declined watchman    Auto rate controlled without AV jessica blocking agents    4.2 pauses in the early morning hours-  will monitor      2. Mitral insufficiency    Moderate on echo 9/2022    Follow with echo    Consider WHITNEY       3. Thoracic aortic dilatation    Stable 9/2022    Follow with echo               Thank you for allowing me to participate in this delightful patient's care.      This note was completed in part using Dragon voice recognition software. Although reviewed after completion, some word and grammatical errors may occur.    Erlinda Mora, APRN CNP, APRN, CNP           Data:   All laboratory data reviewed             Constitutional:  cooperative, alert and oriented, well developed, well nourished, in no acute distress  overweight    Skin:  warm and dry to the touch         Head:  normocephalic       Eyes:  pupils equal and round       ENT:  no pallor or cyanosis       Neck:  no stiffness       Respiratory:  clear to auscultation; normal symmetry        Cardiac: regular rate and irregularly irregular rhythm; normal S1 and S2                 pulses full and equal     GI:  abdomen soft, nondistended     Extremities and Muscular Skeletal:   Bilateral lower extremity edema 1+ to shelter up the shins       Neurological:  affect appropriate; no gross motor deficits       Psych:  Alert and Oriented x  3 , appropriate affact     205.9

## 2025-07-30 ENCOUNTER — OFFICE VISIT (OUTPATIENT)
Dept: DERMATOLOGY | Facility: CLINIC | Age: 85
End: 2025-07-30
Payer: COMMERCIAL

## 2025-07-30 DIAGNOSIS — D18.01 CHERRY ANGIOMA: ICD-10-CM

## 2025-07-30 DIAGNOSIS — L82.1 SEBORRHEIC KERATOSIS: ICD-10-CM

## 2025-07-30 DIAGNOSIS — L57.0 ACTINIC KERATOSIS: Primary | ICD-10-CM

## 2025-07-30 DIAGNOSIS — L81.4 LENTIGO: ICD-10-CM

## 2025-07-30 DIAGNOSIS — D48.5 NEOPLASM OF UNCERTAIN BEHAVIOR OF SKIN: ICD-10-CM

## 2025-07-30 PROCEDURE — 17003 DESTRUCT PREMALG LES 2-14: CPT | Performed by: PHYSICIAN ASSISTANT

## 2025-07-30 PROCEDURE — 99203 OFFICE O/P NEW LOW 30 MIN: CPT | Mod: 25 | Performed by: PHYSICIAN ASSISTANT

## 2025-07-30 PROCEDURE — 11102 TANGNTL BX SKIN SINGLE LES: CPT | Performed by: PHYSICIAN ASSISTANT

## 2025-07-30 PROCEDURE — 11103 TANGNTL BX SKIN EA SEP/ADDL: CPT | Performed by: PHYSICIAN ASSISTANT

## 2025-07-30 PROCEDURE — 17000 DESTRUCT PREMALG LESION: CPT | Mod: 59 | Performed by: PHYSICIAN ASSISTANT

## 2025-07-30 NOTE — LETTER
7/30/2025      Cristian Bridges  [address] [facility] [id number]      Dear Colleague,    Thank you for referring your patient, Cristian Bridges, to the [facility]. Please see a copy of my visit note below.    Cristian Bridges is a pleasant 85 year old year old male patient here today for skin check. He notes area on right temple, scaly. Present for months, not resolving. No pain or bleeding.   Patient has no other skin complaints today.  Remainder of the HPI, Meds, PMH, Allergies, FH, and SH was reviewed in chart.    Pertinent Hx:   No personal history of skin cancer.   Past Medical History:   Diagnosis Date     Irregular heart beat        Past Surgical History:   Procedure Laterality Date     APPENDECTOMY  1947     CRANIOTOMY, EVACUATE HEMATOMA SUBDURAL, COMBINED Right 2/4/2016    Procedure: COMBINED CRANIOTOMY, EVACUATE HEMATOMA SUBDURAL;  Surgeon: Giovanni Caputo MD;  Location:  OR     [address]     EYE SURGERY       ORTHOPEDIC SURGERY       PHACOEMULSIFICATION WITH STANDARD INTRAOCULAR LENS IMPLANT Left 9/21/2015    Procedure: PHACOEMULSIFICATION WITH STANDARD INTRAOCULAR LENS IMPLANT;  Surgeon: Cristobal Banks MD;  Location: [facility] OR     PHACOEMULSIFICATION WITH STANDARD INTRAOCULAR LENS IMPLANT Right 10/5/2015    Procedure: PHACOEMULSIFICATION WITH STANDARD INTRAOCULAR LENS IMPLANT;  Surgeon: Cristobal Banks MD;  Location: [facility] OR     RELEASE CARPAL TUNNEL Left 1/8/2016    Procedure: RELEASE CARPAL TUNNEL;  Surgeon: Joshua Barba MD;  Location: [facility] OR        Family History   Problem Relation Age of Onset     Cerebrovascular Disease Father      Lung Cancer Brother      Skin Cancer Son        Social History     Socioeconomic History     Marital status:      Spouse name: Not on file     Number of children: Not on file     Years of education: Not on file     Highest education level: Not on file   Occupational History     Not on file   Tobacco Use      Smoking status: Former     Current packs/day: 0.00     Types: Cigarettes     Quit date: 1975     Years since quittin.2     Smokeless tobacco: Never   Substance and Sexual Activity     Alcohol use: Not on file     Drug use: Not on file     Sexual activity: Not on file   Other Topics Concern     Parent/sibling w/ CABG, MI or angioplasty before 65F 55M? Not Asked      Service No     Blood Transfusions No     Caffeine Concern No     Occupational Exposure No     Hobby Hazards No     Sleep Concern No     Stress Concern No     Weight Concern No     Special Diet No     Back Care No     Exercise No     Bike Helmet Not Asked     Seat Belt Not Asked     Self-Exams Yes   Social History Narrative     Not on file     Social Drivers of Health     Financial Resource Strain: Not on file   Food Insecurity: Not on file   Transportation Needs: Not on file   Physical Activity: Not on file   Stress: Not on file   Social Connections: Not on file   Interpersonal Safety: Not on file   Housing Stability: Not on file       Outpatient Encounter Medications as of 2025   Medication Sig Dispense Refill     aspirin 81 MG tablet Take 1 tablet (81 mg) by mouth daily  0     dorzolamide-timolol (COSOPT) 2-0.5 % ophthalmic solution 1 drop 2 times daily       glucosamine-chondroitin 500-400 MG CAPS per capsule Take 1 capsule by mouth daily       multivitamin w/minerals (THERA-VIT-M) tablet Take 1 tablet by mouth daily       pilocarpine (PILOCAR) 1 % ophthalmic solution Place 1 drop into both eyes 2 times daily       No facility-administered encounter medications on file as of 2025.             O:   NAD, WDWN, Alert & Oriented, Mood & Affect wnl, Vitals stable   Here today alone   There were no vitals taken for this visit.   General appearance normal   Vitals stable   Alert, oriented and in no acute distress      Gritty papule on right  cheek x 2, right temple x 1, left cheek x 1, scalp x 3    0.9 cm pink pearly thin papule on  mid thoracic spine   0.7 cm pink pearly thin papule on left clavicle   1.2 cm pink pearly papule on left frontal scalp   Stuck on papules and brown macules on trunk and ext   Red papules on trunk        The remainder of skin exam is normal     Eyes: Conjunctivae/lids:Normal     ENT: Lips, mucosa: normal    MSK:Normal    Cardiovascular: peripheral edema none    Pulm: Breathing Normal    Neuro/Psych: Orientation:Alert and Orientedx3 ; Mood/Affect:normal   A/P:  1. R/O NMSC on mid thoracic spine, left clavicle, left frontal scalp  TANGENTIAL BIOPSY SENT OUT:  After consent, anesthesia with LEC and prep, tangential excision performed and specimen sent out for permanent section histology.  No complications and routine wound care. Patient told to call our office in 1-2 weeks for result.      2. Actinic keratosis right  cheek x 2, right temple x 1, left cheek x 1, scalp x 3   LN2:  Treated with LN2 for 5s for 1-2 cycles. Warned risks of blistering, pain, pigment change, scarring, and incomplete resolution.  Advised patient to return if lesions do not completely resolve.  Wound care sheet given.  3.Seborrheic keratosis, lentigo, angioma  BENIGN LESIONS DISCUSSED WITH PATIENT:  I discussed the specifics of tumor, prognosis, and genetics of benign lesions.  I explained that treatment of these lesions would be purely cosmetic and not medically neccessary.  I discussed with patient different removal options including excision, cautery and /or laser.      Nature and genetics of benign skin lesions dicussed with patient.  Signs and Symptoms of skin cancer discussed with patient.  ABCDEs of melanoma reviewed with patient.  Patient encouraged to perform monthly skin exams.  UV precautions reviewed with patient.  Risks of non-melanoma skin cancer discussed with patient   Return to clinic pending biopsy results.       Again, thank you for allowing me to participate in the care of your patient.        Sincerely,        Lashanda Bright  BRITT Oh    Electronically signed

## 2025-07-30 NOTE — PROGRESS NOTES
Cristian Bridges is a pleasant 85 year old year old male patient here today for skin check. He notes area on right temple, scaly. Present for months, not resolving. No pain or bleeding.   Patient has no other skin complaints today.  Remainder of the HPI, Meds, PMH, Allergies, FH, and SH was reviewed in chart.    Pertinent Hx:   No personal history of skin cancer.   Past Medical History:   Diagnosis Date    Irregular heart beat        Past Surgical History:   Procedure Laterality Date    APPENDECTOMY      CRANIOTOMY, EVACUATE HEMATOMA SUBDURAL, COMBINED Right 2016    Procedure: COMBINED CRANIOTOMY, EVACUATE HEMATOMA SUBDURAL;  Surgeon: Giovanni Caputo MD;  Location:  OR    [address]    EYE SURGERY      ORTHOPEDIC SURGERY      PHACOEMULSIFICATION WITH STANDARD INTRAOCULAR LENS IMPLANT Left 2015    Procedure: PHACOEMULSIFICATION WITH STANDARD INTRAOCULAR LENS IMPLANT;  Surgeon: Cristobal Banks MD;  Location: [facility] OR    PHACOEMULSIFICATION WITH STANDARD INTRAOCULAR LENS IMPLANT Right 10/5/2015    Procedure: PHACOEMULSIFICATION WITH STANDARD INTRAOCULAR LENS IMPLANT;  Surgeon: Cristobal Banks MD;  Location: [facility] OR    RELEASE CARPAL TUNNEL Left 2016    Procedure: RELEASE CARPAL TUNNEL;  Surgeon: Joshua Barba MD;  Location: [facility] OR        Family History   Problem Relation Age of Onset    Cerebrovascular Disease Father     Lung Cancer Brother     Skin Cancer Son        Social History     Socioeconomic History    Marital status:      Spouse name: Not on file    Number of children: Not on file    Years of education: Not on file    Highest education level: Not on file   Occupational History    Not on file   Tobacco Use    Smoking status: Former     Current packs/day: 0.00     Types: Cigarettes     Quit date: 1975     Years since quittin.2    Smokeless tobacco: Never   Substance and Sexual Activity    Alcohol use: Not on file    Drug use: Not on file    Sexual  activity: Not on file   Other Topics Concern    Parent/sibling w/ CABG, MI or angioplasty before 65F 55M? Not Asked     Service No    Blood Transfusions No    Caffeine Concern No    Occupational Exposure No    Hobby Hazards No    Sleep Concern No    Stress Concern No    Weight Concern No    Special Diet No    Back Care No    Exercise No    Bike Helmet Not Asked    Seat Belt Not Asked    Self-Exams Yes   Social History Narrative    Not on file     Social Drivers of Health     Financial Resource Strain: Not on file   Food Insecurity: Not on file   Transportation Needs: Not on file   Physical Activity: Not on file   Stress: Not on file   Social Connections: Not on file   Interpersonal Safety: Not on file   Housing Stability: Not on file       Outpatient Encounter Medications as of 7/30/2025   Medication Sig Dispense Refill    aspirin 81 MG tablet Take 1 tablet (81 mg) by mouth daily  0    dorzolamide-timolol (COSOPT) 2-0.5 % ophthalmic solution 1 drop 2 times daily      glucosamine-chondroitin 500-400 MG CAPS per capsule Take 1 capsule by mouth daily      multivitamin w/minerals (THERA-VIT-M) tablet Take 1 tablet by mouth daily      pilocarpine (PILOCAR) 1 % ophthalmic solution Place 1 drop into both eyes 2 times daily       No facility-administered encounter medications on file as of 7/30/2025.             O:   NAD, WDWN, Alert & Oriented, Mood & Affect wnl, Vitals stable   Here today alone   There were no vitals taken for this visit.   General appearance normal   Vitals stable   Alert, oriented and in no acute distress      Gritty papule on right  cheek x 2, right temple x 1, left cheek x 1, scalp x 3    0.9 cm pink pearly thin papule on mid thoracic spine   0.7 cm pink pearly thin papule on left clavicle   1.2 cm pink pearly papule on left frontal scalp   Stuck on papules and brown macules on trunk and ext   Red papules on trunk        The remainder of skin exam is normal     Eyes: Conjunctivae/lids:Normal      ENT: Lips, mucosa: normal    MSK:Normal    Cardiovascular: peripheral edema none    Pulm: Breathing Normal    Neuro/Psych: Orientation:Alert and Orientedx3 ; Mood/Affect:normal   A/P:  1. R/O NMSC on mid thoracic spine, left clavicle, left frontal scalp  TANGENTIAL BIOPSY SENT OUT:  After consent, anesthesia with LEC and prep, tangential excision performed and specimen sent out for permanent section histology.  No complications and routine wound care. Patient told to call our office in 1-2 weeks for result.      2. Actinic keratosis right  cheek x 2, right temple x 1, left cheek x 1, scalp x 3   LN2:  Treated with LN2 for 5s for 1-2 cycles. Warned risks of blistering, pain, pigment change, scarring, and incomplete resolution.  Advised patient to return if lesions do not completely resolve.  Wound care sheet given.  3.Seborrheic keratosis, lentigo, angioma  BENIGN LESIONS DISCUSSED WITH PATIENT:  I discussed the specifics of tumor, prognosis, and genetics of benign lesions.  I explained that treatment of these lesions would be purely cosmetic and not medically neccessary.  I discussed with patient different removal options including excision, cautery and /or laser.      Nature and genetics of benign skin lesions dicussed with patient.  Signs and Symptoms of skin cancer discussed with patient.  ABCDEs of melanoma reviewed with patient.  Patient encouraged to perform monthly skin exams.  UV precautions reviewed with patient.  Risks of non-melanoma skin cancer discussed with patient   Return to clinic pending biopsy results.

## 2025-07-30 NOTE — PATIENT INSTRUCTIONS
Wound Care Instructions     FOR SUPERFICIAL WOUNDS     [facility] [phone]                         AFTER 24 HOURS YOU SHOULD REMOVE THE BANDAGE AND BEGIN DAILY DRESSING CHANGES AS FOLLOWS:     1) Remove Dressing.     2) Clean and dry the area with tap water using a Q-tip or sterile gauze pad.     3) Apply Vaseline, Aquaphor, Polysporin ointment or Bacitracin ointment over entire wound.  Do NOT use Neosporin ointment.     4) Cover the wound with a band-aid, or a sterile non-stick gauze pad and micropore paper tape      REPEAT THESE INSTRUCTIONS AT LEAST ONCE A DAY UNTIL THE WOUND HAS COMPLETELY HEALED.    It is an old wives tale that a wound heals better when it is exposed to air and allowed to dry out. The wound will heal faster with a better cosmetic result if it is kept moist with ointment and covered with a bandage.    **Do not let the wound dry out.**      Supplies Needed:      *Cotton tipped applicators (Q-tips)    *Polysporin Ointment or Bacitracin Ointment (NOT NEOSPORIN)    *Band-aids or non-stick gauze pads and micropore paper tape.      PATIENT INFORMATION:    During the healing process you will notice a number of changes. All wounds develop a small halo of redness surrounding the wound.  This means healing is occurring. Severe itching with extensive redness usually indicates sensitivity to the ointment or bandage tape used to dress the wound.  You should call our office if this develops.      Swelling  and/or discoloration around your surgical site is common, particularly when performed around the eye.    All wounds normally drain.  The larger the wound the more drainage there will be.  After 7-10 days, you will notice the wound beginning to shrink and new skin will begin to grow.  The wound is healed when you can see skin has formed over the entire area.  A healed wound has a healthy, shiny look to the surface and is red to dark pink in color to  normalize.  Wounds may take approximately 4-6 weeks to heal.  Larger wounds may take 6-8 weeks.  After the wound is healed you may discontinue dressing changes.    You may experience a sensation of tightness as your wound heals. This is normal and will gradually subside.    Your healed wound may be sensitive to temperature changes. This sensitivity improves with time, but if you re having a lot of discomfort, try to avoid temperature extremes.    Patients frequently experience itching after their wound appears to have healed because of the continue healing under the skin.  Plain Vaseline will help relieve the itching.        POSSIBLE COMPLICATIONS    BLEEDING:    Leave the bandage in place.  Use tightly rolled up gauze or a cloth to apply direct pressure over the bandage for 30  minutes.  Reapply pressure for an additional 30 minutes if necessary  Use additional gauze and tape to maintain pressure once the bleeding has stopped.     Proper skin care from [facility]:    -Eliminate harsh soaps as they strip the natural oils from the skin, often resulting in dry itchy skin ( i.e. Dial, Zest, [place])  -Use mild soaps such as Cetaphil or Dove Sensitive Skin in the shower. You do not need to use soap on arms, legs, and trunk every time you shower unless visibly soiled.   -Avoid hot or cold showers.  -After showering, lightly (pat) dry off and apply moisturizing within 2-3 minutes. This will help trap moisture in the skin.   -Aggressive use of a moisturizer at least 1-2 times a day to the entire body (including -Vanicream, Cetaphil, Aquaphor or Cerave) and moisturize hands after every washing.  -We recommend using moisturizers that come in a tub that needs to be scooped out, not a pump. This has more of an oil base. It will hold moisture in your skin much better than a water base moisturizer. The above recommended are non-pore clogging.      Wear a sunscreen with at least SPF 30 on your face, ears, neck and  V of the chest daily. Wear sunscreen on other areas of the body if those areas are exposed to the sun throughout the day. Sunscreens can contain physical and/or chemical blockers. Physical blockers are less likely to clog pores, these include zinc oxide and titanium dioxide. Reapply every two hour and after swimming.     Sunscreen examples: https://www.ewg.org/sunscreen/    UV radiation  UVA radiation remains constant throughout the day and throughout the year. It is a longer wavelength than UVB and therefore penetrates deeper into the skin leading to immediate and delayed tanning, photoaging, and skin cancer. 70-80% of UVA and UVB radiation occurs between the hours of 10am-2pm.  UVB radiation  UVB radiation causes the most harmful effects and is more significant during the summer months. However, snow and ice can reflect UVB radiation leading to skin damage during the winter months as well. UVB radiation is responsible for tanning, burning, inflammation, delayed erythema (pinkness), pigmentation (brown spots), and skin cancer.     I recommend self monthly full body exams and yearly full body exams with a dermatology provider. If you develop a new or changing lesion please follow up for examination. Most skin cancers are pink and scaly or pink and pearly. However, we do see blue/brown/black skin cancers.  Consider the ABCDEs of melanoma when giving yourself your monthly full body exam ( don't forget the groin, buttocks, feet, toes, etc). A-asymmetry, B-borders, C-color, D-diameter, E-elevation or evolving. If you see any of these changes please follow up in clinic. If you cannot see your back I recommend purchasing a hand held mirror to use with a larger wall mirror.       Checking for Skin Cancer  You can find cancer early by checking your skin each month. There are 3 kinds of skin cancer. They are melanoma, basal cell carcinoma, and squamous cell carcinoma. Doing monthly skin checks is the best way to find new  marks or skin changes. Follow the instructions below for checking your skin.   The ABCDEs of checking moles for melanoma   Check your moles or growths for signs of melanoma using ABCDE:   Asymmetry: the sides of the mole or growth don t match  Border: the edges are ragged, notched, or blurred  Color: the color within the mole or growth varies  Diameter: the mole or growth is larger than 6 mm (size of a pencil eraser)  Evolving: the size, shape, or color of the mole or growth is changing (evolving is not shown in the images below)    Checking for other types of skin cancer  Basal cell carcinoma or squamous cell carcinoma have symptoms such as:     A spot or mole that looks different from all other marks on your skin  Changes in how an area feels, such as itching, tenderness, or pain  Changes in the skin's surface, such as oozing, bleeding, or scaliness  A sore that does not heal  New swelling or redness beyond the border of a mole    Who s at risk?  Anyone can get skin cancer. But you are at greater risk if you have:   Fair skin, light-colored hair, or light-colored eyes  Many moles or abnormal moles on your skin  A history of sunburns from sunlight or tanning beds  A family history of skin cancer  A history of exposure to radiation or chemicals  A weakened immune system  If you have had skin cancer in the past, you are at risk for recurring skin cancer.   How to check your skin  Do your monthly skin checkups in front of a full-length mirror. Check all parts of your body, including your:   Head (ears, face, neck, and scalp)  Torso (front, back, and sides)  Arms (tops, undersides, upper, and lower armpits)  Hands (palms, backs, and fingers, including under the nails)  Buttocks and genitals  Legs (front, back, and sides)  Feet (tops, soles, toes, including under the nails, and between toes)  If you have a lot of moles, take digital photos of them each month. Make sure to take photos both up close and from a distance.  These can help you see if any moles change over time.   Most skin changes are not cancer. But if you see any changes in your skin, call your doctor right away. Only he or she can diagnose a problem. If you have skin cancer, seeing your doctor can be the first step toward getting the treatment that could save your life.   [employer] last reviewed this educational content on 4/1/2019 2000-2020 [employer]. [address] [place] [address], [place] [address]. All rights reserved. This information is not intended as a substitute for professional medical care. Always follow your healthcare professional's instructions.       When should I call my doctor?  If you are worsening or not improving, please, contact us or seek urgent care as noted below.     Who should I call with questions (adults)?    [facility] [phone]  For urgent needs outside of business hours call the [facility] at [phone] and ask for the dermatology resident on call to be paged  If this is a medical emergency and you are unable to reach an ER, Call [phone]      If you need a prescription refill, please contact your pharmacy. Refills are approved or denied by our Physicians during normal business hours, Monday through Friday.  Per office policy, refills will not be granted if you have not been seen within the past year (or sooner depending on the condition).

## 2025-08-04 ENCOUNTER — TELEPHONE (OUTPATIENT)
Dept: DERMATOLOGY | Facility: CLINIC | Age: 85
End: 2025-08-04
Payer: COMMERCIAL

## 2025-08-04 DIAGNOSIS — C44.519 BASAL CELL CARCINOMA (BCC) OF CLAVICULAR AREA: ICD-10-CM

## 2025-08-04 DIAGNOSIS — C44.42 SCC (SQUAMOUS CELL CARCINOMA), SCALP/NECK: Primary | ICD-10-CM

## 2025-08-04 DIAGNOSIS — C44.519 BCC (BASAL CELL CARCINOMA), BACK: ICD-10-CM

## 2025-08-04 LAB
PATH REPORT.COMMENTS IMP SPEC: ABNORMAL
PATH REPORT.COMMENTS IMP SPEC: ABNORMAL
PATH REPORT.COMMENTS IMP SPEC: YES
PATH REPORT.FINAL DX SPEC: ABNORMAL
PATH REPORT.GROSS SPEC: ABNORMAL
PATH REPORT.MICROSCOPIC SPEC OTHER STN: ABNORMAL
PATH REPORT.RELEVANT HX SPEC: ABNORMAL

## 2025-08-13 ENCOUNTER — OFFICE VISIT (OUTPATIENT)
Dept: DERMATOLOGY | Facility: CLINIC | Age: 85
End: 2025-08-13
Payer: COMMERCIAL

## 2025-08-13 DIAGNOSIS — C44.42 SQUAMOUS CELL CARCINOMA OF SCALP: Primary | ICD-10-CM

## 2025-08-13 PROCEDURE — 17311 MOHS 1 STAGE H/N/HF/G: CPT | Performed by: DERMATOLOGY

## 2025-08-14 ENCOUNTER — APPOINTMENT (OUTPATIENT)
Dept: GENERAL RADIOLOGY | Facility: CLINIC | Age: 85
End: 2025-08-14
Attending: EMERGENCY MEDICINE
Payer: COMMERCIAL

## 2025-08-14 ENCOUNTER — HOSPITAL ENCOUNTER (EMERGENCY)
Facility: CLINIC | Age: 85
Discharge: HOME | End: 2025-08-14
Attending: EMERGENCY MEDICINE
Payer: COMMERCIAL

## 2025-08-14 VITALS
BODY MASS INDEX: 31.5 KG/M2 | TEMPERATURE: 98.7 F | OXYGEN SATURATION: 100 % | WEIGHT: 220 LBS | HEIGHT: 70 IN | HEART RATE: 51 BPM | SYSTOLIC BLOOD PRESSURE: 139 MMHG | DIASTOLIC BLOOD PRESSURE: 121 MMHG | RESPIRATION RATE: 16 BRPM
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
## 2025-08-14 DIAGNOSIS — S42.291A OTHER CLOSED DISPLACED FRACTURE OF PROXIMAL END OF RIGHT HUMERUS, INITIAL ENCOUNTER: Primary | ICD-10-CM

## 2025-08-14 PROCEDURE — 99284 EMERGENCY DEPT VISIT MOD MDM: CPT | Performed by: EMERGENCY MEDICINE

## 2025-08-14 PROCEDURE — 99284 EMERGENCY DEPT VISIT MOD MDM: CPT | Mod: 25 | Performed by: EMERGENCY MEDICINE

## 2025-08-14 PROCEDURE — 250N000011 HC RX IP 250 OP 636: Performed by: EMERGENCY MEDICINE

## 2025-08-14 PROCEDURE — 73060 X-RAY EXAM OF HUMERUS: CPT | Mod: RT

## 2025-08-14 PROCEDURE — 73030 X-RAY EXAM OF SHOULDER: CPT | Mod: RT

## 2025-08-14 PROCEDURE — 250N000013 HC RX MED GY IP 250 OP 250 PS 637: Performed by: EMERGENCY MEDICINE

## 2025-08-14 PROCEDURE — 96374 THER/PROPH/DIAG INJ IV PUSH: CPT

## 2025-08-14 RX ORDER — OXYCODONE HYDROCHLORIDE 5 MG/1
5 TABLET ORAL ONCE
Refills: 0 | Status: COMPLETED | OUTPATIENT
Start: 2025-08-14 | End: 2025-08-14

## 2025-08-14 RX ORDER — OXYCODONE HYDROCHLORIDE 5 MG/1
5 TABLET ORAL EVERY 6 HOURS PRN
Qty: 10 TABLET | Refills: 0 | Status: SHIPPED | OUTPATIENT
Start: 2025-08-14

## 2025-08-14 RX ORDER — KETOROLAC TROMETHAMINE 15 MG/ML
15 INJECTION, SOLUTION INTRAMUSCULAR; INTRAVENOUS ONCE
Status: COMPLETED | OUTPATIENT
Start: 2025-08-14 | End: 2025-08-14

## 2025-08-14 RX ADMIN — OXYCODONE HYDROCHLORIDE 5 MG: 5 TABLET ORAL at 20:46

## 2025-08-14 RX ADMIN — KETOROLAC TROMETHAMINE 15 MG: 15 INJECTION, SOLUTION INTRAMUSCULAR; INTRAVENOUS at 20:46

## 2025-08-14 ASSESSMENT — COLUMBIA-SUICIDE SEVERITY RATING SCALE - C-SSRS
6. HAVE YOU EVER DONE ANYTHING, STARTED TO DO ANYTHING, OR PREPARED TO DO ANYTHING TO END YOUR LIFE?: NO
1. IN THE PAST MONTH, HAVE YOU WISHED YOU WERE DEAD OR WISHED YOU COULD GO TO SLEEP AND NOT WAKE UP?: NO
2. HAVE YOU ACTUALLY HAD ANY THOUGHTS OF KILLING YOURSELF IN THE PAST MONTH?: NO

## 2025-08-14 ASSESSMENT — ACTIVITIES OF DAILY LIVING (ADL): ADLS_ACUITY_SCORE: 44

## 2025-09-03 ENCOUNTER — OFFICE VISIT (OUTPATIENT)
Dept: DERMATOLOGY | Facility: CLINIC | Age: 85
End: 2025-09-03
Payer: COMMERCIAL

## 2025-09-03 DIAGNOSIS — C44.519 BASAL CELL CARCINOMA (BCC) OF CLAVICULAR AREA: Primary | ICD-10-CM

## (undated) RX ORDER — REGADENOSON 0.08 MG/ML
INJECTION, SOLUTION INTRAVENOUS
Status: DISPENSED
Start: 2022-09-21